# Patient Record
Sex: FEMALE | Race: BLACK OR AFRICAN AMERICAN | HISPANIC OR LATINO | Employment: UNEMPLOYED | URBAN - METROPOLITAN AREA
[De-identification: names, ages, dates, MRNs, and addresses within clinical notes are randomized per-mention and may not be internally consistent; named-entity substitution may affect disease eponyms.]

---

## 2017-06-16 ENCOUNTER — CONVERSION ENCOUNTER (OUTPATIENT)
Dept: MAMMOGRAPHY | Facility: CLINIC | Age: 62
End: 2017-06-16

## 2018-08-10 ENCOUNTER — TELEPHONE (OUTPATIENT)
Dept: FAMILY MEDICINE CLINIC | Facility: CLINIC | Age: 63
End: 2018-08-10

## 2018-08-10 NOTE — TELEPHONE ENCOUNTER
Pt states need blood pressure medication sent to pharmacy did not know which    Pt called back states med is losartan 25mg

## 2018-08-15 DIAGNOSIS — I10 BENIGN ESSENTIAL HTN: Primary | ICD-10-CM

## 2018-08-15 RX ORDER — ASPIRIN 81 MG/1
TABLET ORAL EVERY 24 HOURS
COMMUNITY
Start: 2018-06-01 | End: 2018-09-27 | Stop reason: SDUPTHER

## 2018-08-15 RX ORDER — LOSARTAN POTASSIUM AND HYDROCHLOROTHIAZIDE 25; 100 MG/1; MG/1
TABLET ORAL EVERY 24 HOURS
COMMUNITY
Start: 2018-06-01 | End: 2018-08-15 | Stop reason: SDUPTHER

## 2018-08-15 RX ORDER — LOSARTAN POTASSIUM AND HYDROCHLOROTHIAZIDE 25; 100 MG/1; MG/1
1 TABLET ORAL DAILY
Qty: 30 TABLET | Refills: 0 | Status: SHIPPED | OUTPATIENT
Start: 2018-08-15 | End: 2018-09-20 | Stop reason: SDUPTHER

## 2018-09-20 DIAGNOSIS — I10 BENIGN ESSENTIAL HTN: ICD-10-CM

## 2018-09-20 RX ORDER — LOSARTAN POTASSIUM AND HYDROCHLOROTHIAZIDE 25; 100 MG/1; MG/1
1 TABLET ORAL DAILY
Qty: 30 TABLET | Refills: 1 | Status: SHIPPED | OUTPATIENT
Start: 2018-09-20 | End: 2018-09-27 | Stop reason: SDUPTHER

## 2018-09-27 ENCOUNTER — OFFICE VISIT (OUTPATIENT)
Dept: FAMILY MEDICINE CLINIC | Facility: CLINIC | Age: 63
End: 2018-09-27
Payer: COMMERCIAL

## 2018-09-27 VITALS
RESPIRATION RATE: 15 BRPM | HEART RATE: 92 BPM | OXYGEN SATURATION: 99 % | TEMPERATURE: 98.2 F | WEIGHT: 166 LBS | SYSTOLIC BLOOD PRESSURE: 158 MMHG | DIASTOLIC BLOOD PRESSURE: 90 MMHG

## 2018-09-27 DIAGNOSIS — I10 ESSENTIAL HYPERTENSION: Primary | ICD-10-CM
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
## 2018-09-27 DIAGNOSIS — M54.41 RIGHT-SIDED LOW BACK PAIN WITH RIGHT-SIDED SCIATICA, UNSPECIFIED CHRONICITY: ICD-10-CM

## 2018-09-27 PROCEDURE — 99213 OFFICE O/P EST LOW 20 MIN: CPT | Performed by: INTERNAL MEDICINE

## 2018-09-27 RX ORDER — ASPIRIN 81 MG/1
81 TABLET ORAL EVERY 24 HOURS
Qty: 90 TABLET | Refills: 3 | Status: SHIPPED | OUTPATIENT
Start: 2018-09-27

## 2018-09-27 RX ORDER — LOSARTAN POTASSIUM AND HYDROCHLOROTHIAZIDE 25; 100 MG/1; MG/1
1 TABLET ORAL DAILY
Qty: 90 TABLET | Refills: 1 | Status: SHIPPED | OUTPATIENT
Start: 2018-09-27 | End: 2018-11-29 | Stop reason: SDUPTHER

## 2018-09-27 RX ORDER — AMLODIPINE BESYLATE 5 MG/1
5 TABLET ORAL DAILY
Qty: 30 TABLET | Refills: 1 | Status: SHIPPED | OUTPATIENT
Start: 2018-09-27 | End: 2018-11-29

## 2018-09-27 NOTE — ASSESSMENT & PLAN NOTE
Patient currently uncontrolled on current regimen  Counseled on diet and exercise, particularly consumption of low sodium and processed foods  Educated on home blood pressure logs  Continue current medication regimen with losartan/HCTZ  Started on amlodipine  BP check in 1 month

## 2018-09-27 NOTE — PATIENT INSTRUCTIONS
Ejercicios para la espalda baja   CUIDADO AMBULATORIO:   Los ejercicios para la espalda baja  ayudan a sanar y a fortalecer los músculos de high espalda para evitar otra lesión  Pregúntele a high médico si usted necesita acudir con un fisioterapeuta para que le indique ejercicios más avanzado  Busque atención médica de inmediato si:   · Usted tiene dolor severo que le impide moverse  Pregúntele a high Tamara Joshua vitaminas y minerales son adecuados para usted  · High dolor empeora  · Usted tiene un dolor nuevo  · Usted tiene preguntas o inquietudes acerca de high condición o cuidado  Realice los ejercicios para la espalda baja de manera childs:   · Mita kaylyn ejercicios sobre jeyson colchoneta o superficie firme  (no en la cama) para alejandrina soporte a la columna y evitar dolor en la parte baja de la espalda  · Muévase lenta y suavemente  Evite movimientos rápidos o bruscos  · Respire normalmente  No contenga la respiración  · Deténgase si siente dolor  Es normal que sienta cierta molestia al principio  Practicar los ejercicios con regularidad ayudará a disminuir high incomodidad con el paso del [place]  Ejercicios para la espalda baja: High médico podría recomendarle que realice ejercicios para la espalda de 10 a 30 minutos cada día  También podría recomendarle que mita ejercicios 1 a 3 veces cada día  Pregunte a high médico cuáles ejercicios son los mejores para usted y con qué frecuencia hacerlos  · Bombeo del tobillo:  Acuéstese boca arriba  Levante high pie (con kaylyn dedos apuntando hacia high camelia)  Luego, baje high pie (con los dedos apuntando lejos de usted)  Repita kalli ejercicio 10 veces en cada lado  · Deslizamiento de talón:  Acuéstese boca arriba  Muy despacio doble jeyson pierna y luego enderécela  Luego, doble la otra pierna y enderécela  Repita 10 veces en cada lado  · Inclinación pélvica:  Acuéstese boca arriba con kaylyn rodillas dobladas y kaylyn pies planos sobre el piso   Coloque kaylyn brazos en jeyson posición relajada junto a high cuerpo  Contraiga los músculos de high abdomen y aplane high espalda contra el piso  Sostenga está posición por 5 segundos  Repita 5 veces  · Estiramiento de la espalda:  Acuéstese boca arriba con kaylyn mercedes detrás de high camelia  Doble kaylyn rodillas y gire la mitad de high cuerpo hacia un lado  Mantenga esta posición por 10 segundos  Repita 3 veces en cada lado  · Levantamiento de la pierna estirada:  Acuéstese boca Ashanti Rudy con jeyson pierna estirada  Doble la otra rodilla  Contraiga high abdomen y luego levante lentamente la pierna estirada entre 6 a 12 pulgadas del piso  Mantenga esta posición por 1 a 5 segundos  Baje high pierna lentamente  Repita 10 veces en cada pierna  · Rodillas al pecho:  Acuéstese boca arriba con kaylyn rodillas dobladas y kaylyn pies planos sobre el piso  Drema Basket jeyson de las rodillas hacia high pecho y sosténgala por 5 segundos  Regrese high pierna a la posición inicial  Levante la otra rodilla hacia el pecho y sosténgala por 5 segundos  Pily esto 5 veces en cada lado  · Posición amanda camello:  Coloque kaylyn mercedes y [employer]  Arquee high espalda [place], hacia el techo y [place]  Arquee high tate dorsal lo más posible  Sostenga está posición por 5 segundos  Levante high camelia hacia arriba y baje high pecho hacia el piso  Sostenga está posición por 5 segundos  Pily 3 series o fredis se le indique  · Posición de cuclillas contra la pared:  Párese con high espalda contra la pared  Contraiga los músculos de high abdomen  Lentamente deslice high cuerpo hasta que kaylyn rodillas queden dobladas en un ángulo de 45 grados  Mantenga esta posición por 5 segundos  Deslice lentamente high espalda hacia arriba hasta quedar de pie  Repita 10 veces  · Posición de acurrucarse:  Acuéstese boca arriba con kaylyn rodillas dobladas y kaylyn pies planos sobre el piso   Coloque kaylyn mercedes con las nella hacia abajo debajo de la curva de la parte baja de swift espalda  Después, con kaylyn codos [employer], levante kaylyn hombros y [place] 2 a 3 pulgadas  Mantenga swift camelia a la misma altura de kaylyn hombros  Mantenga esta posición por 5 segundos  Cuando usted pueda hacer kalli ejercicio sin sentir dolor por 10 a 15 segundos, puede entonces añadir jeyson rotación  Mientras kaylyn hombros y swift pecho estén levantados del piso, voltee levemente hacia la izquierda y [place]  Repita en el otro lado  · Ejercicio pájaro randy:  Coloque kaylyn mercedes y rodillas sobre el piso  Mantenga kaylyn muñecas directamente debajo de kaylyn hombros y kaylyn rodillas directamente debajo de kaylyn caderas  Contraiga swift ombligo hacia adentro en dirección a swift columna  No estire ni arquee swift espalda  Ponga tensos kaylyn músculos abdominales  Levante un brazo extendido para que se alinee con swift camelia  Luego, levante la pierna opuesta a swift brazo  Mantenga esta posición por 15 segundos  Baje swift Klaudia Joshua y pierna lentamente y Kika de lado  Pily 5 series  © 2017 [address] [facility] Information is for End User's use only and may not be sold, redistributed or otherwise used for commercial purposes  All illustrations and images included in CareNotes® are the copyrighted property of A D A M , Inc  or Zachary Teresa  Esta información es sólo para uso en educación  Swift intención no es darle un consejo médico sobre enfermedades o tratamientos  Colsulte con swift Stormy Binder farmacéutico antes de seguir cualquier régimen médico para saber si es seguro y efectivo para usted

## 2018-10-01 NOTE — PROGRESS NOTES
Assessment/Plan:    Essential hypertension  Patient currently uncontrolled on current regimen  Counseled on diet and exercise, particularly consumption of low sodium and processed foods  Educated on home blood pressure logs  Continue current medication regimen with losartan/HCTZ  Started on amlodipine  BP check in 1 month         Diagnoses and all orders for this visit:    Essential hypertension  -     amLODIPine (NORVASC) 5 mg tablet; Take 1 tablet (5 mg total) by mouth daily  -     Basic metabolic panel; Future  -     losartan-hydrochlorothiazide (HYZAAR) 100-25 MG per tablet; Take 1 tablet by mouth daily  -     aspirin (ECOTRIN LOW STRENGTH) 81 mg EC tablet; Take 1 tablet (81 mg total) by mouth every 24 hours    Right-sided low back pain with right-sided sciatica, unspecified chronicity  -     Ambulatory referral to Physical Therapy; Future  -     XR spine lumbar 2 or 3 views injury; Future    Fall down stairs, initial encounter  -     Ambulatory referral to Physical Therapy; Future  -     XR spine lumbar 2 or 3 views injury; Future        Subjective:      Patient ID: Ken Phelan is a 61 y o  female  Patient is here for follow up of her HTN  She states that she fell down stairs several days ago and fell directly on her butt  She has pain in her L lower back that has improved since the initial incident  She reports that she does have a history of chronic lower back pain  The following portions of the patient's history were reviewed and updated as appropriate: allergies, current medications, past family history, past medical history, past social history, past surgical history and problem list     Review of Systems   Constitutional: Negative for appetite change, chills, fatigue, fever and unexpected weight change  HENT: Negative for congestion, ear pain, rhinorrhea, sinus pain, sinus pressure and sore throat  Eyes: Negative for visual disturbance     Respiratory: Negative for cough, chest tightness, shortness of breath and wheezing  Cardiovascular: Negative for chest pain, palpitations and leg swelling  Gastrointestinal: Negative for abdominal pain, constipation, diarrhea, nausea and vomiting  Genitourinary: Negative for dysuria and flank pain  Musculoskeletal: Positive for back pain  Negative for arthralgias, myalgias and neck pain  Skin: Negative for rash  Allergic/Immunologic: Positive for environmental allergies  Neurological: Negative for dizziness, light-headedness and headaches  Psychiatric/Behavioral: Negative for suicidal ideas  Objective:      /90 (BP Location: Left arm, Patient Position: Sitting, Cuff Size: Standard)   Pulse 92   Temp 98 2 °F (36 8 °C) (Temporal)   Resp 15   Wt 75 3 kg (166 lb)   SpO2 99%          Physical Exam   Constitutional: She is oriented to person, place, and time  She appears well-developed and well-nourished  No distress  HENT:   Head: Normocephalic and atraumatic  Eyes: Pupils are equal, round, and reactive to light  Neck: Normal range of motion  Cardiovascular: Normal rate and normal heart sounds  No murmur heard  Pulmonary/Chest: Effort normal  She has no wheezes  Abdominal: Soft  There is no tenderness  Musculoskeletal: Normal range of motion  She exhibits no edema or tenderness  Lumbar back: She exhibits pain  Neurological: She is alert and oriented to person, place, and time  Skin: Skin is warm and dry  Bruising noted  No rash noted  Psychiatric: She has a normal mood and affect   Her behavior is normal

## 2018-11-29 ENCOUNTER — OFFICE VISIT (OUTPATIENT)
Dept: FAMILY MEDICINE CLINIC | Facility: CLINIC | Age: 63
End: 2018-11-29
Payer: COMMERCIAL

## 2018-11-29 ENCOUNTER — APPOINTMENT (OUTPATIENT)
Dept: LAB | Facility: HOSPITAL | Age: 63
End: 2018-11-29
Payer: COMMERCIAL

## 2018-11-29 VITALS
SYSTOLIC BLOOD PRESSURE: 130 MMHG | OXYGEN SATURATION: 97 % | WEIGHT: 167 LBS | DIASTOLIC BLOOD PRESSURE: 86 MMHG | TEMPERATURE: 97.9 F | RESPIRATION RATE: 16 BRPM | HEART RATE: 93 BPM

## 2018-11-29 DIAGNOSIS — I10 ESSENTIAL HYPERTENSION: ICD-10-CM

## 2018-11-29 DIAGNOSIS — I10 ESSENTIAL HYPERTENSION: Primary | ICD-10-CM

## 2018-11-29 DIAGNOSIS — Z11.59 NEED FOR HEPATITIS C SCREENING TEST: ICD-10-CM

## 2018-11-29 DIAGNOSIS — Z23 NEED FOR VACCINATION: ICD-10-CM

## 2018-11-29 LAB
ALBUMIN SERPL BCP-MCNC: 4.6 G/DL (ref 3–5.2)
ALP SERPL-CCNC: 67 U/L (ref 43–122)
ALT SERPL W P-5'-P-CCNC: 28 U/L (ref 9–52)
ANION GAP SERPL CALCULATED.3IONS-SCNC: 7 MMOL/L (ref 5–14)
AST SERPL W P-5'-P-CCNC: 24 U/L (ref 14–36)
BILIRUB SERPL-MCNC: 0.5 MG/DL
BUN SERPL-MCNC: 16 MG/DL (ref 5–25)
CALCIUM SERPL-MCNC: 9.6 MG/DL (ref 8.4–10.2)
CHLORIDE SERPL-SCNC: 98 MMOL/L (ref 97–108)
CHOLEST SERPL-MCNC: 245 MG/DL
CO2 SERPL-SCNC: 32 MMOL/L (ref 22–30)
CREAT SERPL-MCNC: 0.67 MG/DL (ref 0.6–1.2)
ERYTHROCYTE [DISTWIDTH] IN BLOOD BY AUTOMATED COUNT: 13.5 %
GFR SERPL CREATININE-BSD FRML MDRD: 94 ML/MIN/1.73SQ M
GLUCOSE SERPL-MCNC: 112 MG/DL (ref 70–99)
HCT VFR BLD AUTO: 39.7 % (ref 36–46)
HDLC SERPL-MCNC: 64 MG/DL (ref 40–59)
HGB BLD-MCNC: 13.1 G/DL (ref 12–16)
LDLC SERPL CALC-MCNC: 134 MG/DL
MCH RBC QN AUTO: 28.4 PG (ref 26–34)
MCHC RBC AUTO-ENTMCNC: 33.1 G/DL (ref 31–36)
MCV RBC AUTO: 86 FL (ref 80–100)
NONHDLC SERPL-MCNC: 181 MG/DL
PLATELET # BLD AUTO: 298 THOUSANDS/UL (ref 150–450)
PMV BLD AUTO: 8.7 FL (ref 8.9–12.7)
POTASSIUM SERPL-SCNC: 3.7 MMOL/L (ref 3.6–5)
PROT SERPL-MCNC: 8.1 G/DL (ref 5.9–8.4)
RBC # BLD AUTO: 4.63 MILLION/UL (ref 4–5.2)
SODIUM SERPL-SCNC: 137 MMOL/L (ref 137–147)
TRIGL SERPL-MCNC: 235 MG/DL
WBC # BLD AUTO: 3.5 THOUSAND/UL (ref 4.5–11)

## 2018-11-29 PROCEDURE — 90682 RIV4 VACC RECOMBINANT DNA IM: CPT | Performed by: FAMILY MEDICINE

## 2018-11-29 PROCEDURE — 86803 HEPATITIS C AB TEST: CPT

## 2018-11-29 PROCEDURE — 36415 COLL VENOUS BLD VENIPUNCTURE: CPT

## 2018-11-29 PROCEDURE — 3725F SCREEN DEPRESSION PERFORMED: CPT | Performed by: FAMILY MEDICINE

## 2018-11-29 PROCEDURE — 99213 OFFICE O/P EST LOW 20 MIN: CPT | Performed by: FAMILY MEDICINE

## 2018-11-29 PROCEDURE — 90471 IMMUNIZATION ADMIN: CPT | Performed by: FAMILY MEDICINE

## 2018-11-29 PROCEDURE — 80053 COMPREHEN METABOLIC PANEL: CPT

## 2018-11-29 PROCEDURE — 80061 LIPID PANEL: CPT

## 2018-11-29 PROCEDURE — 85027 COMPLETE CBC AUTOMATED: CPT

## 2018-11-29 RX ORDER — LOSARTAN POTASSIUM AND HYDROCHLOROTHIAZIDE 25; 100 MG/1; MG/1
1 TABLET ORAL DAILY
Qty: 90 TABLET | Refills: 1 | Status: SHIPPED | OUTPATIENT
Start: 2018-11-29 | End: 2019-05-23 | Stop reason: SDUPTHER

## 2018-11-30 LAB — HCV AB SER QL: NORMAL

## 2018-12-03 NOTE — ASSESSMENT & PLAN NOTE
Patient controlled on current regimen  Counseled on diet and exercise, particularly consumption of low sodium and processed foods  Educated on home blood pressure logs  Advised smoking cessation  Continue current medication regimen with hyzaar  CBC, CMP ordered

## 2018-12-03 NOTE — PROGRESS NOTES
Assessment/Plan:    Essential hypertension  Patient controlled on current regimen  Counseled on diet and exercise, particularly consumption of low sodium and processed foods  Educated on home blood pressure logs  Advised smoking cessation  Continue current medication regimen with hyzaar  CBC, CMP ordered         Diagnoses and all orders for this visit:    Essential hypertension  -     losartan-hydrochlorothiazide (HYZAAR) 100-25 MG per tablet; Take 1 tablet by mouth daily  -     Comprehensive metabolic panel; Future  -     CBC and Platelet; Future  -     Lipid panel; Future    Need for vaccination  -     influenza vaccine, [id number], quadrivalent, recombinant, PF, 0 5 mL, for patients 18 yr+ (FLUBLOK)    Need for hepatitis C screening test  -     Hepatitis C antibody; Future    Other orders  -     Cancel: TDAP VACCINE GREATER THAN OR EQUAL TO 6YO IM          Subjective:      Patient ID: Wandy Almodovar is a 61 y o  female  Patient comes in with her son in law for routine follow up  She has no complaints today  As for her injuries in her previous visit from a fall, she has no additional complaints today regarding leg or back pain and never got the imaging she was told to get at her previous visit  She denies chest pain, shortness of breath, nausea, vomiting, diarrhea or constipation  The following portions of the patient's history were reviewed and updated as appropriate: allergies, current medications, past family history, past medical history, past social history, past surgical history and problem list     Review of Systems   Constitutional: Negative for appetite change, chills, fatigue, fever and unexpected weight change  HENT: Negative for congestion, ear pain, rhinorrhea, sinus pain, sinus pressure and sore throat  Eyes: Negative for visual disturbance  Respiratory: Negative for cough, chest tightness, shortness of breath and wheezing      Cardiovascular: Negative for chest pain, palpitations and leg swelling  Gastrointestinal: Negative for abdominal pain, constipation, diarrhea, nausea and vomiting  Genitourinary: Negative for dysuria and flank pain  Musculoskeletal: Negative for arthralgias, back pain, myalgias and neck pain  Skin: Negative for rash  Allergic/Immunologic: Negative for environmental allergies  Neurological: Negative for dizziness, light-headedness and headaches  Psychiatric/Behavioral: Negative for suicidal ideas  Objective:      /86 (BP Location: Left arm, Patient Position: Sitting, Cuff Size: Large)   Pulse 93   Temp 97 9 °F (36 6 °C)   Resp 16   Wt 75 8 kg (167 lb)   SpO2 97%          Physical Exam   Constitutional: She is oriented to person, place, and time  She appears well-developed and well-nourished  No distress  HENT:   Head: Normocephalic and atraumatic  Eyes: Pupils are equal, round, and reactive to light  Neck: Normal range of motion  Cardiovascular: Normal rate and normal heart sounds  No murmur heard  Pulmonary/Chest: Effort normal  She has no wheezes  Abdominal: Soft  There is no tenderness  Musculoskeletal: Normal range of motion  She exhibits no edema or tenderness  Neurological: She is alert and oriented to person, place, and time  Skin: Skin is warm and dry  No rash noted  Psychiatric: She has a normal mood and affect   Her behavior is normal

## 2019-05-23 ENCOUNTER — HOSPITAL ENCOUNTER (OUTPATIENT)
Dept: RADIOLOGY | Facility: HOSPITAL | Age: 64
Discharge: HOME | End: 2019-05-23
Payer: COMMERCIAL

## 2019-05-23 ENCOUNTER — OFFICE VISIT (OUTPATIENT)
Dept: FAMILY MEDICINE CLINIC | Facility: CLINIC | Age: 64
End: 2019-05-23

## 2019-05-23 VITALS
WEIGHT: 162 LBS | TEMPERATURE: 97.8 F | HEART RATE: 76 BPM | DIASTOLIC BLOOD PRESSURE: 82 MMHG | OXYGEN SATURATION: 100 % | SYSTOLIC BLOOD PRESSURE: 138 MMHG | RESPIRATION RATE: 16 BRPM

## 2019-05-23 DIAGNOSIS — I10 ESSENTIAL HYPERTENSION: ICD-10-CM

## 2019-05-23 DIAGNOSIS — M67.912 TENDINOPATHY OF ROTATOR CUFF, LEFT: Primary | ICD-10-CM

## 2019-05-23 PROCEDURE — 73030 X-RAY EXAM OF SHOULDER: CPT

## 2019-05-23 PROCEDURE — 99213 OFFICE O/P EST LOW 20 MIN: CPT | Performed by: INTERNAL MEDICINE

## 2019-05-23 RX ORDER — NAPROXEN 500 MG/1
500 TABLET ORAL 2 TIMES DAILY PRN
Qty: 20 TABLET | Refills: 0 | Status: SHIPPED | OUTPATIENT
Start: 2019-05-23 | End: 2021-01-29 | Stop reason: ALTCHOICE

## 2019-05-23 RX ORDER — LOSARTAN POTASSIUM AND HYDROCHLOROTHIAZIDE 25; 100 MG/1; MG/1
1 TABLET ORAL DAILY
Qty: 180 TABLET | Refills: 0 | Status: SHIPPED | OUTPATIENT
Start: 2019-05-23 | End: 2019-09-10 | Stop reason: SDUPTHER

## 2019-06-13 ENCOUNTER — OFFICE VISIT (OUTPATIENT)
Dept: FAMILY MEDICINE CLINIC | Facility: CLINIC | Age: 64
End: 2019-06-13

## 2019-06-13 VITALS
WEIGHT: 162 LBS | OXYGEN SATURATION: 98 % | RESPIRATION RATE: 16 BRPM | SYSTOLIC BLOOD PRESSURE: 132 MMHG | TEMPERATURE: 97.6 F | DIASTOLIC BLOOD PRESSURE: 72 MMHG | HEART RATE: 91 BPM

## 2019-06-13 DIAGNOSIS — S86.812A STRAIN OF CALF MUSCLE, LEFT, INITIAL ENCOUNTER: ICD-10-CM

## 2019-06-13 DIAGNOSIS — Z12.11 SCREENING FOR COLON CANCER: ICD-10-CM

## 2019-06-13 DIAGNOSIS — I10 ESSENTIAL HYPERTENSION: Primary | ICD-10-CM

## 2019-06-13 DIAGNOSIS — M19.012 ARTHRITIS OF LEFT ACROMIOCLAVICULAR JOINT: ICD-10-CM

## 2019-06-13 PROBLEM — M67.912 TENDINOPATHY OF ROTATOR CUFF, LEFT: Status: RESOLVED | Noted: 2019-05-23 | Resolved: 2019-06-13

## 2019-06-13 PROCEDURE — 99213 OFFICE O/P EST LOW 20 MIN: CPT | Performed by: FAMILY MEDICINE

## 2019-08-14 ENCOUNTER — OFFICE VISIT (OUTPATIENT)
Dept: FAMILY MEDICINE CLINIC | Facility: CLINIC | Age: 64
End: 2019-08-14

## 2019-08-14 ENCOUNTER — TRANSCRIBE ORDERS (OUTPATIENT)
Dept: LAB | Facility: CLINIC | Age: 64
End: 2019-08-14

## 2019-08-14 ENCOUNTER — APPOINTMENT (OUTPATIENT)
Dept: LAB | Facility: CLINIC | Age: 64
End: 2019-08-14
Payer: COMMERCIAL

## 2019-08-14 VITALS
SYSTOLIC BLOOD PRESSURE: 126 MMHG | RESPIRATION RATE: 18 BRPM | TEMPERATURE: 98 F | WEIGHT: 156 LBS | DIASTOLIC BLOOD PRESSURE: 72 MMHG | HEART RATE: 77 BPM | OXYGEN SATURATION: 98 %

## 2019-08-14 DIAGNOSIS — Z00.00 HEALTHCARE MAINTENANCE: ICD-10-CM

## 2019-08-14 DIAGNOSIS — I10 ESSENTIAL HYPERTENSION: ICD-10-CM

## 2019-08-14 DIAGNOSIS — M19.012 ARTHRITIS OF LEFT ACROMIOCLAVICULAR JOINT: Primary | ICD-10-CM

## 2019-08-14 DIAGNOSIS — R25.2 BILATERAL LEG CRAMPS: ICD-10-CM

## 2019-08-14 LAB
25(OH)D3 SERPL-MCNC: 20.4 NG/ML (ref 30–100)
ALBUMIN SERPL BCP-MCNC: 4 G/DL (ref 3.5–5)
ALP SERPL-CCNC: 67 U/L (ref 46–116)
ALT SERPL W P-5'-P-CCNC: 21 U/L (ref 12–78)
ANION GAP SERPL CALCULATED.3IONS-SCNC: 5 MMOL/L (ref 4–13)
AST SERPL W P-5'-P-CCNC: 14 U/L (ref 5–45)
BASOPHILS # BLD AUTO: 0.03 THOUSANDS/ΜL (ref 0–0.1)
BASOPHILS NFR BLD AUTO: 1 % (ref 0–1)
BILIRUB SERPL-MCNC: 0.58 MG/DL (ref 0.2–1)
BUN SERPL-MCNC: 14 MG/DL (ref 5–25)
CALCIUM SERPL-MCNC: 9.4 MG/DL (ref 8.3–10.1)
CHLORIDE SERPL-SCNC: 102 MMOL/L (ref 100–108)
CHOLEST SERPL-MCNC: 229 MG/DL (ref 50–200)
CO2 SERPL-SCNC: 30 MMOL/L (ref 21–32)
CREAT SERPL-MCNC: 0.74 MG/DL (ref 0.6–1.3)
EOSINOPHIL # BLD AUTO: 0.15 THOUSAND/ΜL (ref 0–0.61)
EOSINOPHIL NFR BLD AUTO: 5 % (ref 0–6)
ERYTHROCYTE [DISTWIDTH] IN BLOOD BY AUTOMATED COUNT: 13.3 % (ref 11.6–15.1)
EST. AVERAGE GLUCOSE BLD GHB EST-MCNC: 123 MG/DL
GFR SERPL CREATININE-BSD FRML MDRD: 86 ML/MIN/1.73SQ M
GLUCOSE P FAST SERPL-MCNC: 96 MG/DL (ref 65–99)
HBA1C MFR BLD: 5.9 % (ref 4.2–6.3)
HCT VFR BLD AUTO: 41.5 % (ref 34.8–46.1)
HDLC SERPL-MCNC: 78 MG/DL (ref 40–60)
HGB BLD-MCNC: 13.5 G/DL (ref 11.5–15.4)
IMM GRANULOCYTES # BLD AUTO: 0 THOUSAND/UL (ref 0–0.2)
IMM GRANULOCYTES NFR BLD AUTO: 0 % (ref 0–2)
LDLC SERPL CALC-MCNC: 127 MG/DL (ref 0–100)
LYMPHOCYTES # BLD AUTO: 1.64 THOUSANDS/ΜL (ref 0.6–4.47)
LYMPHOCYTES NFR BLD AUTO: 49 % (ref 14–44)
MCH RBC QN AUTO: 28.5 PG (ref 26.8–34.3)
MCHC RBC AUTO-ENTMCNC: 32.5 G/DL (ref 31.4–37.4)
MCV RBC AUTO: 88 FL (ref 82–98)
MONOCYTES # BLD AUTO: 0.4 THOUSAND/ΜL (ref 0.17–1.22)
MONOCYTES NFR BLD AUTO: 12 % (ref 4–12)
NEUTROPHILS # BLD AUTO: 1.1 THOUSANDS/ΜL (ref 1.85–7.62)
NEUTS SEG NFR BLD AUTO: 33 % (ref 43–75)
NONHDLC SERPL-MCNC: 151 MG/DL
NRBC BLD AUTO-RTO: 0 /100 WBCS
PLATELET # BLD AUTO: 308 THOUSANDS/UL (ref 149–390)
PMV BLD AUTO: 10.9 FL (ref 8.9–12.7)
POTASSIUM SERPL-SCNC: 3.7 MMOL/L (ref 3.5–5.3)
PROT SERPL-MCNC: 8.1 G/DL (ref 6.4–8.2)
RBC # BLD AUTO: 4.74 MILLION/UL (ref 3.81–5.12)
SODIUM SERPL-SCNC: 137 MMOL/L (ref 136–145)
TRIGL SERPL-MCNC: 122 MG/DL
TSH SERPL DL<=0.05 MIU/L-ACNC: 1.31 UIU/ML (ref 0.36–3.74)
WBC # BLD AUTO: 3.32 THOUSAND/UL (ref 4.31–10.16)

## 2019-08-14 PROCEDURE — 85025 COMPLETE CBC W/AUTO DIFF WBC: CPT

## 2019-08-14 PROCEDURE — 99213 OFFICE O/P EST LOW 20 MIN: CPT | Performed by: PHYSICIAN ASSISTANT

## 2019-08-14 PROCEDURE — 82306 VITAMIN D 25 HYDROXY: CPT

## 2019-08-14 PROCEDURE — 80053 COMPREHEN METABOLIC PANEL: CPT

## 2019-08-14 PROCEDURE — 36415 COLL VENOUS BLD VENIPUNCTURE: CPT

## 2019-08-14 PROCEDURE — 83036 HEMOGLOBIN GLYCOSYLATED A1C: CPT

## 2019-08-14 PROCEDURE — 84443 ASSAY THYROID STIM HORMONE: CPT

## 2019-08-14 PROCEDURE — 80061 LIPID PANEL: CPT

## 2019-08-14 NOTE — PROGRESS NOTES
Assessment/Plan:  - Will order blood work - CBC, CMP, HgbA1c, lipid panel, TSH, vitamin D  Hypertension  - Continue losartan-HCTZ 100-25 mg once daily  - Currently blood pressure is controlled at this time  Reviewed BP target goal with patient  Continue to maintain healthy balanced diet with focus on low salt intake  Limit alcohol intake  - Advised to exercise at least 30 minutes a day for at least 5 days out of the week  Arthritis of left acromioclavicular joint  - Stable  Continue taking naproxen 500 mg as needed for pain  Continue daily stretches and exercises  Continue applying heating pad/ ice as needed to the affected area  Discussed with patient option of going to physical therapy and/or having a steroid injection of her shoulder  Patient declines both at this time  Will continue to monitor  Bilateral leg cramps  - Advised to continue applying heating pads as needed to the affected areas  Advised to massage the area daily  Will order CMP to assess for electrolyte abnormalities  Advised to stay well hydrated by drinking water throughout the day  Will continue to monitor  Diagnoses and all orders for this visit:    Arthritis of left acromioclavicular joint    Healthcare maintenance  -     CBC and differential; Future  -     Comprehensive metabolic panel; Future  -     Lipid panel; Future  -     TSH, 3rd generation with Free T4 reflex; Future  -     Hemoglobin A1C; Future  -     Vitamin D 25 hydroxy; Future    Essential hypertension    Bilateral leg cramps        All of patients questions were answered  Patient understands and agrees with the above plan  Return in about 6 months (around 2/14/2020), or if symptoms worsen or fail to improve, for Next scheduled follow up      Cielo Stokes PA-C  08/14/19  [facility]           Subjective:     Patient ID: Jocy Martinez  is a 61 y o  female with known PMH of   Hypertension, arthritis of left acromioclavicular joint who presents today in office for left shoulder pain follow-up  - Patient is a 61 y o  female who presents today for left shoulder pain follow-up  Patient notes her left shoulder does not her all the time  She notes it mostly hurts when she bends forward  She notes she takes a naproxen as needed -she notes she maybe takes it once a week if that  Patient notes she was given a list of exercises to perform at home a last office visit and she notes she has been performing them daily  Patient notes she has also been applying heating pad as needed to the affected area which has been helpful  - Patient notes for the past couple weeks she has been experiencing cramping and both lower legs  She notes it is pretty constant, but is worse often at night  And after walking on her feet for a long period  Patient notes at work she does walk around on her feet for 7-8 hours at a time  Patient notes when she bends her knees, it causes more pain in her calves  Patient notes the pain is worse when she stands up after sitting for a while  Patient denies any trauma or injury to the area  Patient denies any redness or bruising of the area  Patient has been applying heating pads as needed to the affected areas which has been helpful  - Patient is currently taking losartan-HCTZ 100-25 mg once daily for her high blood pressure  Patient denies any chest pain, dizziness, visual changes, or lower leg swelling  The following portions of the patient's history were reviewed and updated as appropriate: allergies, current medications, past family history, past medical history, past social history, past surgical history and problem list         Review of Systems   Constitutional: Negative for appetite change, fatigue and fever  HENT: Negative for congestion, ear pain and sore throat  Eyes: Negative for pain and visual disturbance  Respiratory: Negative for cough, chest tightness and shortness of breath      Cardiovascular: Negative for chest pain, palpitations and leg swelling  Gastrointestinal: Negative for abdominal pain, constipation, diarrhea, nausea and vomiting  Genitourinary: Negative for difficulty urinating and dysuria  Musculoskeletal: Positive for arthralgias (left shoulder pain) and myalgias (bilateral lower leg cramping)  Negative for back pain  Skin: Negative for rash  Neurological: Negative for dizziness and headaches  Psychiatric/Behavioral: Negative for behavioral problems  The patient is not nervous/anxious  Objective:   Vitals:    08/14/19 0850   BP: 126/72   BP Location: Right arm   Patient Position: Sitting   Cuff Size: Standard   Pulse: 77   Resp: 18   Temp: 98 °F (36 7 °C)   TempSrc: Temporal   SpO2: 98%   Weight: 70 8 kg (156 lb)         Physical Exam   Constitutional: She is oriented to person, place, and time  She appears well-developed and well-nourished  HENT:   Head: Normocephalic and atraumatic  Right Ear: External ear normal    Left Ear: External ear normal    Nose: Nose normal    Mouth/Throat: Uvula is midline, oropharynx is clear and moist and mucous membranes are normal    Eyes: Pupils are equal, round, and reactive to light  Conjunctivae and EOM are normal    Neck: Normal range of motion  Neck supple  Cardiovascular: Normal rate, regular rhythm, normal heart sounds and intact distal pulses  No murmur heard  Pulmonary/Chest: Effort normal and breath sounds normal  She has no wheezes  Abdominal: Soft  Bowel sounds are normal  There is no tenderness  Musculoskeletal: She exhibits no edema  Left shoulder: She exhibits decreased range of motion and tenderness  She exhibits no bony tenderness, no swelling and no effusion  Slight tenderness noted upon palpation of bilateral calves  No erythema, edema, or ecchymosis noted  Strength 5/5 bilateral LE  Neurological: She is alert and oriented to person, place, and time  Skin: Skin is warm and dry     Psychiatric: She has a normal mood and affect  Her speech is normal and behavior is normal    Nursing note and vitals reviewed

## 2019-09-10 DIAGNOSIS — I10 ESSENTIAL HYPERTENSION: ICD-10-CM

## 2019-09-11 RX ORDER — LOSARTAN POTASSIUM AND HYDROCHLOROTHIAZIDE 25; 100 MG/1; MG/1
1 TABLET ORAL DAILY
Qty: 180 TABLET | Refills: 0 | Status: SHIPPED | OUTPATIENT
Start: 2019-09-11 | End: 2020-07-08 | Stop reason: SDUPTHER

## 2019-11-18 DIAGNOSIS — E55.9 VITAMIN D DEFICIENCY: Primary | ICD-10-CM

## 2019-11-18 RX ORDER — CHOLECALCIFEROL (VITAMIN D3) 50 MCG
2000 TABLET ORAL DAILY
Qty: 90 TABLET | Refills: 1 | Status: SHIPPED | OUTPATIENT
Start: 2019-11-18

## 2020-07-08 DIAGNOSIS — I10 ESSENTIAL HYPERTENSION: ICD-10-CM

## 2020-07-09 RX ORDER — LOSARTAN POTASSIUM AND HYDROCHLOROTHIAZIDE 25; 100 MG/1; MG/1
1 TABLET ORAL DAILY
Qty: 180 TABLET | Refills: 0 | Status: SHIPPED | OUTPATIENT
Start: 2020-07-09 | End: 2021-01-27

## 2021-01-20 ENCOUNTER — TELEPHONE (OUTPATIENT)
Dept: FAMILY MEDICINE CLINIC | Facility: CLINIC | Age: 66
End: 2021-01-20

## 2021-01-20 DIAGNOSIS — I10 ESSENTIAL HYPERTENSION: ICD-10-CM

## 2021-01-27 RX ORDER — LOSARTAN POTASSIUM AND HYDROCHLOROTHIAZIDE 25; 100 MG/1; MG/1
TABLET ORAL
Qty: 180 TABLET | Refills: 0 | Status: SHIPPED | OUTPATIENT
Start: 2021-01-27 | End: 2021-01-29 | Stop reason: SDUPTHER

## 2021-01-29 ENCOUNTER — OFFICE VISIT (OUTPATIENT)
Dept: FAMILY MEDICINE CLINIC | Facility: CLINIC | Age: 66
End: 2021-01-29

## 2021-01-29 VITALS
OXYGEN SATURATION: 99 % | HEART RATE: 98 BPM | SYSTOLIC BLOOD PRESSURE: 134 MMHG | WEIGHT: 173.1 LBS | BODY MASS INDEX: 34.9 KG/M2 | HEIGHT: 59 IN | DIASTOLIC BLOOD PRESSURE: 80 MMHG | RESPIRATION RATE: 18 BRPM | TEMPERATURE: 97.5 F

## 2021-01-29 DIAGNOSIS — E66.09 CLASS 2 OBESITY DUE TO EXCESS CALORIES WITHOUT SERIOUS COMORBIDITY WITH BODY MASS INDEX (BMI) OF 35.0 TO 35.9 IN ADULT: ICD-10-CM

## 2021-01-29 DIAGNOSIS — M22.2X1 PATELLOFEMORAL PAIN SYNDROME OF RIGHT KNEE: ICD-10-CM

## 2021-01-29 DIAGNOSIS — E55.9 VITAMIN D DEFICIENCY: ICD-10-CM

## 2021-01-29 DIAGNOSIS — Z00.00 ANNUAL PHYSICAL EXAM: Primary | ICD-10-CM

## 2021-01-29 DIAGNOSIS — I10 ESSENTIAL HYPERTENSION: ICD-10-CM

## 2021-01-29 DIAGNOSIS — Z13.31 DEPRESSION SCREEN: ICD-10-CM

## 2021-01-29 PROCEDURE — 99397 PER PM REEVAL EST PAT 65+ YR: CPT | Performed by: PHYSICIAN ASSISTANT

## 2021-01-29 RX ORDER — LOSARTAN POTASSIUM AND HYDROCHLOROTHIAZIDE 25; 100 MG/1; MG/1
1 TABLET ORAL DAILY
Qty: 180 TABLET | Refills: 1 | Status: SHIPPED | OUTPATIENT
Start: 2021-01-29 | End: 2022-02-13

## 2021-01-29 NOTE — ASSESSMENT & PLAN NOTE
- Continue losartan-HCTZ 100-25 mg once daily  - Currently blood pressure is controlled at this time  Reviewed BP target goal with patient  - Continue to maintain healthy balanced diet with focus on low salt intake  Limit alcohol intake  - Advised to exercise at least 30 minutes a day for at least 5 days out of the week

## 2021-01-29 NOTE — PROGRESS NOTES
[facility] PRACTICE ZARA    NAME: Iron Corey  AGE: 72 y o  SEX: female  : 1955     DATE: 2021     Assessment and Plan:     Problem List Items Addressed This Visit        Cardiovascular and Mediastinum    Essential hypertension     - Continue losartan-HCTZ 100-25 mg once daily  - Currently blood pressure is controlled at this time  Reviewed BP target goal with patient  - Continue to maintain healthy balanced diet with focus on low salt intake  Limit alcohol intake  - Advised to exercise at least 30 minutes a day for at least 5 days out of the week  Relevant Medications    losartan-hydrochlorothiazide (HYZAAR) 100-25 MG per tablet    Other Relevant Orders    CBC and differential    Comprehensive metabolic panel    Lipid panel       Musculoskeletal and Integument    Patellofemoral pain syndrome of right knee     - Stable  Continue OTD diclofenac as needed  Other Visit Diagnoses     Annual physical exam    -  Primary    Depression screen        Vitamin D deficiency        Relevant Orders    Vitamin D 25 hydroxy    Class 2 obesity due to excess calories without serious comorbidity with body mass index (BMI) of 35 0 to 35 9 in adult            Depression Screening Follow-up Plan: Patient's depression screening was positive with a PHQ-2 score of 0  Their PHQ-9 score was not indicated  Clinically patient does not have depression  No treatment is required  Immunizations and preventive care screenings were discussed with patient today  Appropriate education was printed on patient's after visit summary  Counseling:  Alcohol/drug use: discussed moderation in alcohol intake, the recommendations for healthy alcohol use, and avoidance of illicit drug use  Dental Health: discussed importance of regular tooth brushing, flossing, and dental visits    Injury prevention: discussed safety/seat belts, safety helmets, smoke detectors, carbon dioxide detectors, and smoking near bedding or upholstery  Sexual health: discussed sexually transmitted diseases, partner selection, use of condoms, avoidance of unintended pregnancy, and contraceptive alternatives  · Exercise: the importance of regular exercise/physical activity was discussed  Recommend exercise 3-5 times per week for at least 30 minutes  BMI Counseling: Body mass index is 35 56 kg/m²  The BMI is above normal  Nutrition recommendations include decreasing portion sizes, encouraging healthy choices of fruits and vegetables, consuming healthier snacks, moderation in carbohydrate intake and reducing intake of cholesterol  Exercise recommendations include moderate physical activity 150 minutes/week and strength training exercises  No pharmacotherapy was ordered  Return in about 6 months (around 7/29/2021) for Next scheduled follow up HTN  Chief Complaint:     Chief Complaint   Patient presents with    Hypertension     f/u     Medication Refill      History of Present Illness:     Adult Annual Physical   Patient here for a comprehensive physical exam      Hypertension: Currently taking losartan -hydrochlorothiazide 100-25 mg once daily  Patient notes she did not have her medication for a few days and during that time, she did experience some headaches  Patient notes once she started taking the medication again, the headaches resolved  Patient denies any dizziness, blurry vision, chest pain, or lower leg swelling     - Patient reported to [facility] ED on 08/20/2020 due to right knee pain  Right knee x-ray revealed no acute fracture  Patient followed up with Orthopedics and received cortisone injection of her right knee  Patient notes the shot did help her a lot  Today, patient notes she does continue to have pain often on  Patient notes she will take diclofenac as needed which does help        Diet and Physical Activity  · Diet/Nutrition: well balanced diet  · Exercise: no formal exercise and patient notes she is active around the house with ADLs         Depression Screening  PHQ-9 Depression Screening    PHQ-9:   Frequency of the following problems over the past two weeks:      Little interest or pleasure in doing things: 0 - not at all  Feeling down, depressed, or hopeless: 0 - not at all  PHQ-2 Score: 0       General Health  · Sleep: sleeps well  · Hearing: normal - bilateral   · Vision: wears glasses and follows with eye doctor in [facility]  · Dental: regular dental visits and follows with dentist in [facility]  /GYN Health  · Last menstrual period: Postmenopausal     Review of Systems:     Review of Systems   Constitutional: Negative for appetite change, chills, fatigue and fever  HENT: Negative for congestion and sore throat  Eyes: Negative for pain  Respiratory: Negative for cough, chest tightness and shortness of breath  Cardiovascular: Negative for chest pain, palpitations and leg swelling  Gastrointestinal: Negative for abdominal pain, constipation, diarrhea, nausea and vomiting  Genitourinary: Negative for difficulty urinating and dysuria  Musculoskeletal: Positive for arthralgias (right knee pain occasionally)  Skin: Negative for rash  Neurological: Negative for dizziness and headaches  Psychiatric/Behavioral: The patient is not nervous/anxious         Past Medical History:     Past Medical History:   Diagnosis Date    Hyperlipidemia     Hypertension       Past Surgical History:     Past Surgical History:   Procedure Laterality Date     SECTION        Social History:     Social History     Socioeconomic History    Marital status: Single     Spouse name: None    Number of children: None    Years of education: None    Highest education level: None   Occupational History    None   Social Needs    Financial resource strain: None    Food insecurity     Worry: None     Inability: None    Transportation needs     Medical: None     Non-medical: None   Tobacco Use    Smoking status: Never Smoker    Smokeless tobacco: Never Used   Substance and Sexual Activity    Alcohol use: Yes     Comment: social    Drug use: No    Sexual activity: None   Lifestyle    Physical activity     Days per week: None     Minutes per session: None    Stress: None   Relationships    Social connections     Talks on phone: None     Gets together: None     Attends Orthodox service: None     Active member of club or organization: None     Attends meetings of clubs or organizations: None     Relationship status: None    Intimate partner violence     Fear of current or ex partner: None     Emotionally abused: None     Physically abused: None     Forced sexual activity: None   Other Topics Concern    None   Social History Narrative    None      Family History:     Family History   Problem Relation Age of Onset    Asthma Mother     Hypertension Mother     COPD Father       Current Medications:     Current Outpatient Medications   Medication Sig Dispense Refill    aspirin (ECOTRIN LOW STRENGTH) 81 mg EC tablet Take 1 tablet (81 mg total) by mouth every 24 hours 90 tablet 3    Cholecalciferol (VITAMIN D) 50 MCG (2000 UT) tablet Take 1 tablet (2,000 Units total) by mouth daily 90 tablet 1    losartan-hydrochlorothiazide (HYZAAR) 100-25 MG per tablet Take 1 tablet by mouth daily 180 tablet 1     No current facility-administered medications for this visit  Allergies: Allergies   Allergen Reactions    No Active Allergies     No Known Allergies       Physical Exam:     /80 (BP Location: Right arm, Patient Position: Sitting, Cuff Size: Adult)   Pulse 98   Temp 97 5 °F (36 4 °C) (Temporal)   Resp 18   Ht 4' 10 5" (1 486 m)   Wt 78 5 kg (173 lb 1 6 oz)   SpO2 99%   BMI 35 56 kg/m²     Physical Exam  Vitals signs and nursing note reviewed     Constitutional:       Appearance: She is well-developed  HENT:      Head: Normocephalic and atraumatic  Right Ear: Tympanic membrane and external ear normal       Left Ear: Tympanic membrane and external ear normal       Nose: Nose normal       Mouth/Throat:      Pharynx: Uvula midline  Eyes:      Extraocular Movements: Extraocular movements intact  Conjunctiva/sclera: Conjunctivae normal       Pupils: Pupils are equal, round, and reactive to light  Neck:      Musculoskeletal: Normal range of motion and neck supple  Cardiovascular:      Rate and Rhythm: Normal rate and regular rhythm  Heart sounds: Normal heart sounds  No murmur  No gallop  Pulmonary:      Effort: Pulmonary effort is normal       Breath sounds: Normal breath sounds  No wheezing  Abdominal:      General: Bowel sounds are normal       Palpations: Abdomen is soft  Tenderness: There is no abdominal tenderness  There is no guarding  Musculoskeletal: Normal range of motion  General: No swelling  Skin:     General: Skin is warm and dry  Neurological:      Mental Status: She is alert and oriented to person, place, and time     Psychiatric:         Mood and Affect: Mood normal          Speech: Speech normal          Behavior: Behavior normal          SURY Herrera

## 2021-01-29 NOTE — PATIENT INSTRUCTIONS
Hipertensión   LO QUE NECESITA SABER:   La hipertensión es la presión arterial teagan  La presión arterial es la fuerza que ejerce la venu al [employer] contra las rosen de las arterias  La hipertensión causa que high presión arterial se eleve tanto que high corazón se ve forzado a trabajar mucho más anibal que lo normal  [place] puede dañar high corazón  Puede que no se conozca la causa de la hipertensión  [place] se denomina hipertensión esencial o primaria  La hipertensión causada por otra afección médica, tales fredis la enfermedad renal, se denomina hipertensión secundaria  INSTRUCCIONES SOBRE [facility]:   Llame al 911 en nasrin de presentar lo siguiente:  · Usted tiene dolor en el pecho  · Tiene alguno de los siguientes signos de un ataque cardíaco:      ? Estrujamiento, presión o tensión en high pecho    ? Usted también podría presentar alguno de los siguientes:     § Malestar o dolor en high espalda, ryan, mandíbula, abdomen, o brazo    § Falta de [employer] o vómitos    § Desvanecimiento o sudor frío repentino    · Usted se siente confundido o tiene dificultad para hablar  · Repentinamente se siente aturdido o con dificultad para respirar  Regrese a la constanza de emergencias si:  · Usted tiene un anibal dolor de camelia o pérdida de la visión  · Usted tiene debilidad en un brazo o en jeyson pierna  Comuníquese con high médico si:  · Usted se siente mareado, confundido, somnoliento o fredis si se fuera a desmayar  · Usted ha estado tomando medicamento para la presión arterial gita todavía está en un nivel más elevado del que high médico le indicó que debería estar  · Usted tiene preguntas o inquietudes acerca de high condición o cuidado  Medicamentos: Es posible que usted necesite alguno de los siguientes:  · Los antihipertensivos podrían usarse para ayudar a disminuir la presión arterial  Varios tipos de medicamentos están disponibles   High médico elegirá medicamentos basados en el tipo de hipertensión que tiene  Es posible que necesite más de un tipo de [employer]  [place] el medicamento exactamente fredis indicado  · Los diuréticos ayudan a eliminar el exceso de líquido que se acumula en el organismo  [place] ayudará a bajar high presión arterial  Es posible que orine más seguido mientras juan carlos kalli medicamento  · Los medicamentos para el colesterol ayudan a bajar los niveles de [place]  Un nivel bajo de colesterol ayuda a prevenir enfermedades cardíacas y facilita el control de la presión arterial     · Durand kaylyn medicamentos fredis se le haya indicado  Consulte con high médico si usted eric que high medicamento no le está ayudando o si presenta efectos secundarios  Infórmele si es alérgico a cualquier medicamento  Mantenga jeyson lista actualizada de los [employer], las vitaminas y los productos herbales que juan carlos  Incluya los siguientes datos de los medicamentos: cantidad, frecuencia y motivo de administración  Traiga con usted la lista o los envases de las píldoras a kaylyn citas de seguimiento  Lleve la lista de los medicamentos con usted en nasrin de jeyson emergencia  Acuda a kaylyn consultas de control con high médico según le indicaron  Usted necesitará regresar para medir high presión arterial y realizar otros exámenes de laboratorio  Anote kaylyn preguntas para que se acuerde de hacerlas bri kaylyn visitas  [place] de la hipertensión:      · Myrna Ache presión arterial normal es 119/79 o inferior   High médico podría comprobar high presión arterial solamente cada año si se mantiene en un nivel normal     · Jeyson presión arterial elevada es de 120/79 a 129/79   A veces esto se llama prehipertensión  High médico puede sugerir cambios de estilo de dank para ayudar a bajar la presión arterial a un nivel normal  Entonces él podría comprobar high presión otra vez en 3 a 6 meses  · La etapa 1 de la hipertensión es de 130/80  a 139/89    High médico podría recomendar cambios de estilo de dank, medicamentos y controles cada 3 a 6 meses hasta que high presión arterial esté controlada  · La etapa 2 de la hipertensión es de 140/90 o mayor   High médico le recomendará cambios en el estilo de dank y le indicará 2 clases de medicamentos para la hipertensión  También necesitará controlar high presión arterial cada mes hasta que esté controlada  Maneje high hipertensión:  · Controle high presión arterial en casa  Evite fumar, consumir cafeína y hacer ejercicio al menos 30 minutos antes de controlar high presión arterial  Siéntese y descanse por 5 minutos antes de tomarse la presión arterial  Extienda high brazo y apóyelo en jeyson superficie plana  High brazo debe estar a la misma altura que high corazón  Siga las instrucciones que vienen con el monitor para la presión arterial  Controle high presión arterial 2 veces, con diferencia de 1 minuto, antes de dawson high medicamento por la mañana  También controle high presión arterial antes de la trudi  Mantenga un registro de high peso y llévelo con usted a las citas de control  Pregúntele a high médico cuál debería ser high presión arterial          · Controle cualquier otra condición médica que usted tenga  Algunas condiciones médicas fredis la diabetes pueden aumentar high riesgo de hipertensión  [address] high médico y tómese kaylyn medicamentos según dichas instrucciones  · Pregunte [employer]  Ciertos medicamentos pueden aumentar high presión arterial  Los ejemplos incluyen las píldoras anticonceptivas orales, los descongestivos, los suplementos herbales y los [place], fredis el ibuprofeno  High médico puede indicarle qué medicamentos son seguros para usted  Estos medicamentos incluyen los recetados y de [place]  Cambios de estilo de dank que usted puede hacer para manejar la hipertensión:  · Limite el sodio (la sal) fredis se le haya indicado  Demasiado sodio puede afectar el equilibrio de líquidos  Revise las etiquetas para buscar alimentos bajos en sodio o sin kyra agregada   Algunos alimentos bajos en sodio utilizan sales de potasio para añadir sabor  Demasiado potasio también puede causar problemas de [place]  High médico le dirá qué cantidad de sodio y potasio es childs para el consumo en un día  Él puede recomendarle que limite el sodio a 2,300 mg al día  · Siga el plan de comidas recomendado por high médico  Un dietista o médico puede darle más información sobre planes de bajo contenido de sodio o el plan de alimentación DASH (enfoques dietéticos para detener la hipertensión)  El plan DASH es bajo en sodio, grasas saturadas y grasa total  Es alto en potasio, calcio y Samantha  · Ejercítese para mantener un peso saludable  Realice actividad física por lo menos 30 minutos al día, la mayoría de los días de la [place]  Furman ayudará a bajar high presión arterial  Pregunte a high médico acerca del mejor plan de ejercicio para usted  · [address] [facility] estr[address]  Es posible que esto contribuya a bajar high presión arterial  Aprenda sobre formas de [place], fredis respiración profunda o escuchar música  · Limite el consumo de alcohol según le indicaron  El alcohol puede aumentar la presión arterial  Un trago equivale a 12 onzas de cerveza, 5 onzas de vino o 1 onza y ½ de licor  · No fume  La nicotina y otros químicos en los cigarrillos y cigarros pueden aumentar high presión arterial y también provocar daño al pulmón  Pida información a high médico si usted actualmente fuma y necesita ayuda para dejar de fumar  Los cigarrillos electrónicos o el tabaco sin humo igualmente contienen nicotina  Consulte con high médico antes de [employer]  © Copyright [address] Information is for End User's use only and may not be sold, redistributed or otherwise used for commercial purposes  All illustrations and images included in CareNotes® are the copyrighted property of James JAMES  or [address] [place] [address] es sólo para uso en educación   High intención no es darle un consejo [employer] enfermedades o tratamientos  Colsulte con high Kamryn Merl farmacéutico antes de seguir cualquier régimen médico para saber si es seguro y efectivo para usted

## 2022-03-16 ENCOUNTER — APPOINTMENT (OUTPATIENT)
Dept: LAB | Facility: HOSPITAL | Age: 67
End: 2022-03-16
Payer: MEDICARE

## 2022-03-16 ENCOUNTER — HOSPITAL ENCOUNTER (OUTPATIENT)
Dept: RADIOLOGY | Facility: HOSPITAL | Age: 67
Discharge: HOME | End: 2022-03-16
Payer: MEDICARE

## 2022-03-16 ENCOUNTER — OFFICE VISIT (OUTPATIENT)
Dept: FAMILY MEDICINE CLINIC | Facility: CLINIC | Age: 67
End: 2022-03-16

## 2022-03-16 VITALS
HEIGHT: 59 IN | DIASTOLIC BLOOD PRESSURE: 80 MMHG | RESPIRATION RATE: 16 BRPM | TEMPERATURE: 98 F | SYSTOLIC BLOOD PRESSURE: 160 MMHG | OXYGEN SATURATION: 97 % | WEIGHT: 164 LBS | BODY MASS INDEX: 33.06 KG/M2 | HEART RATE: 92 BPM

## 2022-03-16 DIAGNOSIS — M54.42 ACUTE LEFT-SIDED LOW BACK PAIN WITH LEFT-SIDED SCIATICA: ICD-10-CM

## 2022-03-16 DIAGNOSIS — I10 ESSENTIAL HYPERTENSION: Primary | ICD-10-CM

## 2022-03-16 DIAGNOSIS — E66.09 CLASS 1 OBESITY DUE TO EXCESS CALORIES WITHOUT SERIOUS COMORBIDITY WITH BODY MASS INDEX (BMI) OF 33.0 TO 33.9 IN ADULT: ICD-10-CM

## 2022-03-16 DIAGNOSIS — Z12.11 COLON CANCER SCREENING: ICD-10-CM

## 2022-03-16 DIAGNOSIS — M54.42 ACUTE LEFT-SIDED LOW BACK PAIN WITH LEFT-SIDED SCIATICA: Primary | ICD-10-CM

## 2022-03-16 DIAGNOSIS — Z13.31 DEPRESSION SCREEN: ICD-10-CM

## 2022-03-16 DIAGNOSIS — I10 ESSENTIAL HYPERTENSION: ICD-10-CM

## 2022-03-16 LAB
ALBUMIN SERPL BCP-MCNC: 4.7 G/DL (ref 3–5.2)
ALP SERPL-CCNC: 87 U/L (ref 43–122)
ALT SERPL W P-5'-P-CCNC: 23 U/L
ANION GAP SERPL CALCULATED.3IONS-SCNC: 9 MMOL/L (ref 5–14)
AST SERPL W P-5'-P-CCNC: 28 U/L (ref 14–36)
BASOPHILS # BLD AUTO: 0 THOUSANDS/ΜL (ref 0–0.1)
BASOPHILS NFR BLD AUTO: 1 % (ref 0–1)
BILIRUB SERPL-MCNC: 0.74 MG/DL
BUN SERPL-MCNC: 16 MG/DL (ref 5–25)
CALCIUM SERPL-MCNC: 9.8 MG/DL (ref 8.4–10.2)
CHLORIDE SERPL-SCNC: 98 MMOL/L (ref 97–108)
CHOLEST SERPL-MCNC: 277 MG/DL
CO2 SERPL-SCNC: 30 MMOL/L (ref 22–30)
CREAT SERPL-MCNC: 0.76 MG/DL (ref 0.6–1.2)
EOSINOPHIL # BLD AUTO: 0.1 THOUSAND/ΜL (ref 0–0.4)
EOSINOPHIL NFR BLD AUTO: 4 % (ref 0–6)
ERYTHROCYTE [DISTWIDTH] IN BLOOD BY AUTOMATED COUNT: 13.3 %
GFR SERPL CREATININE-BSD FRML MDRD: 82 ML/MIN/1.73SQ M
GLUCOSE P FAST SERPL-MCNC: 115 MG/DL (ref 70–99)
HCT VFR BLD AUTO: 40.1 % (ref 36–46)
HDLC SERPL-MCNC: 81 MG/DL
HGB BLD-MCNC: 13.8 G/DL (ref 12–16)
LDLC SERPL CALC-MCNC: 142 MG/DL
LYMPHOCYTES # BLD AUTO: 1.5 THOUSANDS/ΜL (ref 0.5–4)
LYMPHOCYTES NFR BLD AUTO: 37 % (ref 25–45)
MCH RBC QN AUTO: 28.8 PG (ref 26–34)
MCHC RBC AUTO-ENTMCNC: 34.5 G/DL (ref 31–36)
MCV RBC AUTO: 84 FL (ref 80–100)
MONOCYTES # BLD AUTO: 0.4 THOUSAND/ΜL (ref 0.2–0.9)
MONOCYTES NFR BLD AUTO: 10 % (ref 1–10)
NEUTROPHILS # BLD AUTO: 2 THOUSANDS/ΜL (ref 1.8–7.8)
NEUTS SEG NFR BLD AUTO: 49 % (ref 45–65)
NONHDLC SERPL-MCNC: 196 MG/DL
PLATELET # BLD AUTO: 320 THOUSANDS/UL (ref 150–450)
PMV BLD AUTO: 8.7 FL (ref 8.9–12.7)
POTASSIUM SERPL-SCNC: 3.9 MMOL/L (ref 3.6–5)
PROT SERPL-MCNC: 8.8 G/DL (ref 5.9–8.4)
RBC # BLD AUTO: 4.8 MILLION/UL (ref 4–5.2)
SODIUM SERPL-SCNC: 137 MMOL/L (ref 137–147)
TRIGL SERPL-MCNC: 269 MG/DL
WBC # BLD AUTO: 4.1 THOUSAND/UL (ref 4.5–11)

## 2022-03-16 PROCEDURE — 80061 LIPID PANEL: CPT

## 2022-03-16 PROCEDURE — 72110 X-RAY EXAM L-2 SPINE 4/>VWS: CPT

## 2022-03-16 PROCEDURE — 36415 COLL VENOUS BLD VENIPUNCTURE: CPT

## 2022-03-16 PROCEDURE — 80053 COMPREHEN METABOLIC PANEL: CPT

## 2022-03-16 PROCEDURE — 85025 COMPLETE CBC W/AUTO DIFF WBC: CPT

## 2022-03-16 PROCEDURE — 99214 OFFICE O/P EST MOD 30 MIN: CPT | Performed by: PHYSICIAN ASSISTANT

## 2022-03-16 RX ORDER — LOSARTAN POTASSIUM AND HYDROCHLOROTHIAZIDE 25; 100 MG/1; MG/1
1 TABLET ORAL DAILY
Qty: 90 TABLET | Refills: 1 | Status: SHIPPED | OUTPATIENT
Start: 2022-03-16

## 2022-03-16 RX ORDER — METHOCARBAMOL 500 MG/1
500 TABLET, FILM COATED ORAL 2 TIMES DAILY PRN
Qty: 60 TABLET | Refills: 1 | Status: SHIPPED | OUTPATIENT
Start: 2022-03-16

## 2022-03-16 NOTE — ASSESSMENT & PLAN NOTE
-  Systolic blood pressure is slightly elevated today, however patient notes at home her blood pressure readings have been ranging around 130/80  Patient does admit to being nervous today  - Will continue with current medication regimen-losartan -hydrochlorothiazide 100-25 mg daily   - Reviewed BP target goal with patient  - Advised to continue checking blood pressure a few times a week and to contact the office if blood pressure is consistently >140/90   - Continue to maintain healthy balanced diet with focus on low salt intake  Limit alcohol intake  - Advised to exercise at least 30 minutes a day for at least 5 days out of the week

## 2022-03-16 NOTE — PATIENT INSTRUCTIONS
Detección de cáncer colorrectal   CUIDADO AMBULATORIO:   La detección de cáncer colorrectal significa que se lo examina aunque no tenga signos o síntomas  El cáncer colorrectal es la tercera causa principal de muerte en los [place] y [place]  La prueba de detección regular sirve para encontrar problemas a tiempo y comenzar el tratamiento  El tratamiento temprano puede salvarle la dank  Tipos de prueba de detección disponibles: High médico hablará con usted sobre los beneficios y riesgos de cada tipo de prueba de detección: Trabajará con usted para decidir qué prueba de detección es mejor para usted  También le dirá cuándo debe empezar a realizarse la prueba de detección  · El examen de venu oculta en heces (FOBT) puede recolectarse en casa  El examen de venu oculta en heces busca la presencia de venu en kaylyn evacuaciones intestinales  Necesitará jeyson pequeña muestra de 2 a 3 evacuaciones intestinales  High médico le dará tarjetas especiales para colocar la muestra  Usted devolverá las tarjetas al consultorio de high médico o a un laboratorio para que le treva la prueba  Si high prueba es positiva para la presencia de [place], usted necesitará hacerse jeyson colonoscopia  Jeyson colonoscopia o sigmoidoscopia flexible puede ayudar a encontrar de dónde proviene [place] [employer]  La venu puede significar que usted tiene pólipos, cáncer u otras afecciones, fredis hemorroides  Es posible que tenga que evitar ciertos medicamentos y alimentos bri aproximadamente 3 días antes de dawson la [place]  High médico le dirá qué medicamentos y alimentos debe evitar  Si no se encuentra venu, es posible que tenga que hacerse la prueba el próximo [place]  · Jeyson prueba inmunoquímica fecal (FIT) también se recoge en casa y luego se envía a un laboratorio para high análisis  La prueba inmunoquímica fecal también examina la presencia de venu en las evacuaciones intestinales   Usted no tiene que evitar ningún medicamento o alimento para hacerse la prueba inmunoquímica fecal  Usted recoge [place] de jeyson evacuación intestinal y la coloca en un recipiente especial  El recipiente se envía por correo o se lleva al consultorio o laboratorio de high médico  Si se encuentra venu, es posible que necesite jeyson colonoscopia o jeyson sigmoidoscopia flexible  Si no se encuentra venu, es posible que tenga que hacerse la prueba el próximo [place]  · Jeyson sigmoidoscopia es un procedimiento para sylwia dentro del recto y el colon sigmoide  El colon sigmoide es la parte inferior de los intestinos, la más cercana al recto  Se le insertará un sigmoidoscopio en el recto  Kalli es un tubo con Orin Shillings ceci y jeyson Herndon Margo en el extremo  Imágenes del colon aparecen en un monitor bri el procedimiento  Jeyson sigmoidoscopia flexible podría ayudar a diagnosticar cáncer de colon, inflamación, pólipos (crecimientos) o infecciones  · Jeyson colonoscopia es un procedimiento para examinar con un endoscopio el interior de high colon (intestino)  La sonda es un tubo flexible con jeyson ceci pequeña y Judy Millin en la punta  Bri jeyson colonoscopia, es posible que le retiren pólipos o crecimientos de tejidos para analizarlos y detectar la presencia de cáncer  · [place] colonoscopia virtual es un tipo de examen que utiliza radiografías para examinar el interior del colon (intestino grueso)  Los médicos utilizan jeyson tomografía computarizada o imágenes por resonancia magnética para dawson imágenes del colon desde fuera de high cuerpo  Necesitará ingerir líquido de contraste para que las imágenes se aprecien mejor  Kalli procedimiento se [place] para determinar si tiene pólipos (crecimientos) o cáncer  También puede controlar el tamaño de los pólipos  Es posible que necesite kalli procedimiento para comprobar si el cáncer colorrectal ha vuelto a aparecer después del tratamiento  Se puede utilizar jeyson colonoscopia virtual si no es capaz de someterse jeyson colonoscopia regular      · El ADN en high evacuación intestinal puede analizarse para detectar cambios genéticos  Los cambios genéticos pueden ser un signo de cáncer colorrectal     Con qué frecuencia puede necesitar pruebas de detección de cáncer colorrectal: Se recomiendan las pruebas de detección a partir de los 48 años y [place] 76 si se tiene un riesgo promedio  High médico puede sugerirle que se someta a las pruebas de detección a partir de los [facility]  Las pruebas de detección pueden comenzar antes de los 39 años o continuar después de los 76 si high riesgo es alto  High médico le enseñará con qué frecuencia debe hacerse las pruebas de detección  El momento depende del tipo de detección y si se encontraron pólipos u otros problemas  El momento también depende de high edad y de si usted tiene mayor riesgo de tener cáncer  Es posible que necesite hacerse las pruebas de detección cada 1, 2, 5 o 10 años  Riesgos de la detección del cáncer colorrectal: Cualquier tipo de prueba de detección tiene riesgos  Hable con high médico sobre los riesgos de la detección  Es posible que [employer] indique lo siguiente:  · Puede ocurrir un resultado falso-negativo  El resultado puede retrasar el tratamiento porque muestra que no se encontró cáncer  Puede hacer que usted no reciba tratamiento incluso cuando tenga síntomas  · Puede ocurrir un resultado falso positivo  Karime resultado puede hacer que le treva más pruebas  También puede hacer que se sienta ansioso si eric que tiene cáncer  © Copyright [employer] 2022 Information is for End User's use only and may not be sold, redistributed or otherwise used for commercial purposes  All illustrations and images included in CareNotes® are the copyrighted property of [employer]  or [address] [place] [address] es sólo para uso en educación  High intención no es darle un consejo médico sobre enfermedades o tratamientos   Colsulte con high Micaela Seals farmacéutico antes de seguir cualquier régimen médico para saber si es seguro y efectivo para usted  Visita de bienestar para los adultos   CUIDADO AMBULATORIO:   Jeyson visita de bienestar es cuando usted acude con un médico para que le treva exámenes de detección de problemas de [place]  High médico también le dará consejos sobre cómo mantenerse saludable  Anote kaylyn preguntas para que se acuerde de hacerlas  Pregunte a high médico con qué frecuencia debería realizarse jeyson visita de bienestar  Lo que sucede en jeyson visita de bienestar: High médico le preguntará sobre high emile y high historia familiar [id number] [facility]  [place] incluye presión arterial coco, enfermedades del corazón y cáncer  El médico le preguntará si tiene síntomas que le preocupen, si [facility] y [place] de ánimo  También se le preguntará acerca del uso de medicamentos, suplementos, alimentos y alcohol  Es posible que le treva cualquiera de lo siguiente:  · High peso será revisado  Es posible que [facility] midan high altura para calcular high índice de masa corporal [facility])  El [facility] indica si tiene un peso saludable  · Se verificarán high presión arterial y frecuencia cardíaca  También pueden revisar high temperatura  · Exámenes de [place] y [facility] podría realizarse  Se podrían realizar exámenes de venu para revisar los niveles de [facility]  Los niveles anormales de colesterol aumentan el riesgo de enfermedad del corazón y accidente cerebrovascular  Puede que también necesite jeyson prueba de venu u orina para revisar si tiene diabetes si usted está en mayor riesgo  Las pruebas de orina pueden hacerse para buscar signos de jeyson infección o jeyson enfermedad renal     · Un examen físico incluye la comprobación de kaylyn latidos del corazón y los pulmones con un estetoscopio  High médico también podría revisarle la piel para buscar daños causados por el sol  · Pruebas de detección podría recomendarse  Se realiza un examen de detección para detectar enfermedades que pueden no causar síntomas   Los exámenes de detección que necesite dependen de high edad, género, antecedentes familiares y hábitos de dank  Por ejemplo, podrían recomendarle la exploración selectiva colorrectal si tiene 48 años o más  Si es [place], necesita los siguientes exámenes de detección:  · El examen de Papanicolaou se utiliza para detectar cáncer de ryan uterino  El examen del Papanicolaou por lo general se realiza entre 3 a 5 años dependiendo de high edad  Puede necesitarlo más a menudo si usted ha tenido [employer] de la prueba de Papanicolaou en el pasado  Pregunte a high médico con qué frecuencia debería realizarse un examen de Papanicolaou  · Naa Roch es jeyson radiografía de kaylyn mamas para detectar cáncer de mama  Los expertos recomiendan [address] cada 2 años empezando a los 48 años de [place]  Es probable que usted necesite Naa Roch a los 52 años o antes si tiene riesgo alto de cáncer de seno  Hable con high médico sobre cuándo debe empezar con kaylyn mamografías y con cuánta frecuencia las necesita  Vacunas que podría necesitar:  · Debe recibir jeyson vacuna contra la influenza todos los [place]  La vacuna contra la influenza protege de la gripe  Varios tipos de virus causan la influenza  Debido a que los virus [place] con el [place], es necesaria la producción de nuevas vacunas cada año  · Debe recibir [place] N[place]da vacuna de refuerzo contra el tétanos-difteria (Td) cada 10 años  Esta vacuna protege contra el tétanos y la difteria  El tétanos es jeyson infección severa que puede causar trismo y espasmos musculares dolorosos  La difteria es jeyson infección bacteriana grave que produce jeyson cubierta gruesa en la parte de atrás de la boca y garganta  · Debe recibir la vacuna contra el virus del papiloma humano (VPH) si usted es jason y [place] 19 y 32 años o es hombre y [place] 23 y 24 años y nunca la recibió  Esta vacuna protege contra la infección por VPH   El virus del papiloma humano o VPH es la infección más común que se transmite por contacto sexual  [place] virus del papiloma humano también podría provocar cáncer vaginal, del pene y del ano  · Debe recibir la vacuna antineumocócica si tiene más de 72 años  La vacuna antineumocócica es jeyson inyección que se administra para protegerlo contra jeyson enfermedad neumocócica  La enfermedad neumocócica es jeyson infección causada por la bacteria neumocócica  La infección puede causar neumonía, meningitis o jeyson infección del oído  · Debe recibir la vacuna contra la culebrilla Si tiene 61 años o más, incluso si ha tenido culebrilla antes  La vacuna contra la culebrilla (herpes zóster) es jeyson inyección usada para proteger contra el virus zóster que causa la varicela  Karime es el mismo virus que causa la varicela  La culebrilla es un sarpullido doloroso que se desarrolla en personas que tuvieron varicela o stout estado expuestas al virus  Cómo comer saludable: [place] es un modelo para planear comidas sanas  Muestra los tipos y cantidades de alimentos que deberían ir en un plato  Mercedes Noman y verduras representan alrededor de la mitad de high plato y los granos y proteínas representan la otra mitad  Se incluye jeyson porción de productos lácteos al lado del plato  La cantidad de calorías y [address] de las porciones que usted necesita dependen de high edad, [place], peso y altura  Los ejemplos de alimentos saludables son:  · Consuma jeyson variedad de verduras fredis las de color ludin oscuro, velez y [place]  Usted también puede incluir verduras enlatadas bajas en sodio (sal) y verduras congeladas sin mantequilla ni salsas [id number]  · Consuma jeyson variedad de fruta frescas , las frutas enlatadas en 100% de jugo, fruta [place] y sandra secos  · Incluya granos enteros  Por lo menos la mitad de los granos que usted consume deben ser granos de juliet integral  Por ejemplo, panes de grano entero, pasta integral, arroz integral y cereales de grano entero fredis la dana      · Consuma jeyson variedad de alimentos con proteínas fredis mariscos (pescado y crustáceos), [place] y carnjackie de ave sin piel (pavo y giovanni)  Ejemplos de berna magras incluyen pierna, paleta o lomo de puerco y lama rosa, solomillo o, lomo de res y carne [place] de res extra [place]  Otros alimentos ricos en proteínas son los huevos y sustitutos de [place], frijoles, chícharos, productos de soya, nueces y semillas  · Elija productos lácteos bajos en grasa [employer] o del 1% o yogur, queso y requesón bajos en grasa  · Limite las grasas poco saludables, fredis la [place], la margarina dura y la [place]  Ejercicio: Realice jeyson actividad física por lo menos 30 minutos al día, la mayoría de los días de la [place]  Algunos de los ejercicios incluyen caminar, montar en bicicleta, bailar y nadar  Usted también puede realizar más actividad física usando las escaleras en vez de los ascensores o estacionarse más lejos cuando Gary Alejandra a las tiendas  Incluya ejercicios para fortalecer los músculos 2 días a la semana  El ejercicio regular proporciona muchos beneficios para la emile  Alline Mems a controlar high peso y [employer] riesgo de diabetes tipo 2, presión arterial coco, enfermedad del corazón y accidente cerebrovascular  El ejercicio [employer] puede ayudar a mejorar high estado de ánimo  Pregunte a high médico acerca del mejor plan de ejercicio para usted  Pautas generales de emile y seguridad:  · No fume  La nicotina y otras sustancias químicas que contienen los cigarrillos y cigarros pueden dañar los pulmones  Pida información a high médico si usted actualmente fuma y necesita ayuda para dejar de fumar  Los cigarrillos electrónicos o el tabaco sin humo igualmente contienen nicotina  Consulte con high médico antes de [employer]  · Limite el consumo de alcohol  Un trago equivale a 12 onzas de cerveza, 5 onzas de vino o 1 onza y ½ de licor  · Baje de peso, si es necesario  El sobrepeso aumenta el riesgo de ciertas condiciones de [place] enfermedad del corazón, presión arterial coco, diabetes tipo 2 y ciertos tipos de cáncer  · Proteja high piel  No tome el sol ni use adalgisa de bronceado  Use un protector solar con un FPS mayor a 13  Aplíquese el bloqueador por lo menos 15 minutos antes de que vaya a estar al [employer]  Vuelva a aplicarse la crema solar cada 2 horas  Use ropa protectora, sombrero y lentes para el sol cuando se encuentre afuera  · Conduzca con seguridad  Use siempre high cinturón de seguridad  Asegúrese que todos en el teressa usan el cinturón de seguridad  Un cinturón de seguridad puede salvar high dank en nasrin de un accidente automovilístico  No use el celular cuando esté manejando  [place] puede hacer que se distraiga y causar un accidente  Es mejor que pare y se orille si necesita hacer jeyson Scherrie Rile un texto  · Practique el sexo seguro  Use condones de látex si es sexualmente [place] y tiene más de [place]  High médico puede recomendar exámenes de detección de infecciones de transmisión sexual (ITS)  · Use un carl, un chaleco salvavidas y unos implementos de protección  Siempre use un carl al [employer] en bicicleta o motocicleta, esquiar o jugar deportes que podrían causar jeyson lesión en la camelia  Use implementos de protección cuando practique deportes  Use un chaleco salvavidas cuando esté en un bote o practicando actividades acuáticas  © Copyright [employer] 2022 Information is for End User's use only and may not be sold, redistributed or otherwise used for commercial purposes  All illustrations and images included in CareNotes® are the copyrighted property of A [employer] [address] es sólo para uso en educación  High intención no es darle un consejo médico sobre enfermedades o tratamientos  Colsulte con high Gala Primer farmacéutico antes de seguir cualquier régimen médico para saber si es seguro y efectivo para usted        Hipertensión   LO QUE NECESITA SABER:   Tino Graff hipertensión es la presión arterial teagan  La presión arterial es la fuerza que ejerce la venu al [employer] contra las rosen de las arterias  La hipertensión causa que swift presión arterial se eleve tanto que swift corazón se ve forzado a trabajar mucho más anibal que lo normal  [place] puede dañar swift corazón  Puede que no se conozca la causa de la hipertensión  [place] se denomina hipertensión esencial o primaria  La hipertensión causada por otra afección médica, tales fredis la enfermedad renal, se denomina hipertensión secundaria  INSTRUCCIONES SOBRE EL [facility]:   Llame al [employer] de emergencias (911 en [address]) o pídale a alguien que llame si:  · Usted tiene dolor en el pecho  · Tiene alguno de los siguientes signos de un ataque cardíaco:      ? Estrujamiento, presión o tensión en swift pecho    ? Usted también podría presentar alguno de los siguientes:     § Malestar o dolor en swift espalda, ryan, mandíbula, abdomen, o brazo    § Falta de [employer] o vómitos    § Desvanecimiento o sudor frío repentino    · Usted se siente confundido o tiene dificultad para hablar  · Repentinamente se siente aturdido o con dificultad para respirar  Regrese a la constanza de emergencias si:  · Usted tiene un anibal dolor de camelia o pérdida de la visión  · Usted tiene debilidad en un brazo o en jeyson pierna  Llame a swift médico o cardiólogo si:  · Usted se siente mareado, confundido, somnoliento o fredis si se fuera a desmayar  · Usted ha estado tomando medicamento para la presión arterial gita todavía está en un nivel más elevado del que swift médico le indicó que debería estar  · Usted tiene preguntas o inquietudes acerca de swift condición o cuidado  Medicamentos: Es posible que usted necesite alguno de los siguientes:  · Los antihipertensivos podrían usarse para ayudar a disminuir la presión arterial  Varios tipos de medicamentos están disponibles   Swift médico elegirá medicamentos basados en el tipo de hipertensión que tiene  Es posible que necesite más de un tipo de [place]  [place] el medicamento exactamente fredis indicado  · Los diuréticos ayudan a eliminar el exceso de líquido que se acumula en el organismo  [place] ayudará a bajar high presión arterial  Es posible que orine más seguido mientras juan carlos kalli medicamento  · Los medicamentos para el colesterol ayudan a bajar los niveles de [place]  Un nivel bajo de colesterol ayuda a prevenir enfermedades cardíacas y facilita el control de la presión arterial     · [place] kaylyn medicamentos fredis se le haya indicado  Consulte con high médico si usted eric que high medicamento no le está ayudando o si presenta efectos secundarios  Infórmele si es alérgico a cualquier medicamento  Mantenga jeyson lista actualizada de los [place], las vitaminas y los productos herbales que juan carlos  Incluya los siguientes datos de los medicamentos: cantidad, frecuencia y motivo de administración  Traiga con usted la lista o los envases de las píldoras a kaylyn citas de seguimiento  Lleve la lista de los medicamentos con usted en nasrin de jeyson emergencia  Acuda a kaylyn consultas de control con high médico o cardiólogo según le indicaron: Usted necesitará regresar para medir high presión arterial y realizar otros exámenes de laboratorio  Anote kaylyn preguntas para que se acuerde de hacerlas bri kaylyn visitas  [place] de la hipertensión:      · [place] presión arterial normal es 119/79 o inferior   High médico podría comprobar high presión arterial solamente cada año si se mantiene en un nivel normal     · Jeyson presión arterial elevada es de 120/79 a 129/79   A veces esto se llama prehipertensión  High médico puede sugerir cambios de estilo de dank para ayudar a bajar la presión arterial a un nivel normal  Entonces él podría comprobar high presión otra vez en 3 a 6 meses  · La etapa 1 de la hipertensión es de 130/80  a 139/89    High médico podría recomendar cambios de estilo de dank, medicamentos y controles cada 3 a 6 meses hasta que high presión arterial esté controlada  · La etapa 2 de la hipertensión es de 140/90 o mayor   High médico le recomendará cambios en el estilo de dank y le indicará 2 clases de medicamentos para la hipertensión  También necesitará controlar high presión arterial cada mes hasta que esté controlada  Maneje high hipertensión:  · Controle high presión arterial en casa  Evite fumar, consumir cafeína y hacer ejercicio al menos 30 minutos antes de controlar high presión arterial  Siéntese y descanse por 5 minutos antes de tomarse la presión arterial  Extienda high brazo y apóyelo en jeyson superficie plana  High brazo debe estar a la misma altura que high corazón  Siga las instrucciones que vienen con el monitor para la presión arterial  Controle high presión arterial 2 veces, con diferencia de 1 minuto, antes de dawson high medicamento por la mañana  También controle high presión arterial antes de la trudi  Mantenga un registro de high peso y llévelo con usted a las citas de control  Pregúntele a high médico cuál debería ser high presión arterial          · Controle cualquier otra condición médica que usted tenga  Algunas condiciones médicas fredis la diabetes pueden aumentar high riesgo de hipertensión  [address] high médico y tómese kaylyn medicamentos según dichas instrucciones  · Pregunte [employer]  Ciertos medicamentos pueden aumentar high presión arterial  Los ejemplos incluyen las píldoras anticonceptivas orales, los descongestivos, los suplementos herbales y los [place], fredis el ibuprofeno  High médico puede indicarle qué medicamentos son seguros para usted  Estos medicamentos incluyen los recetados y de [place]  Cambios de estilo de dank que usted puede hacer para manejar la hipertensión: High médico puede recomendarle que trabaje con un equipo para controlar la hipertensión   El equipo puede incluir expertos médicos, fredis un dietista, un fisioterapeuta o un terapeuta del comportamiento  Los [employer] de high airam pueden participar en la creación de cambios en el estilo de dank  · Limite el sodio (la sal) fredis se le haya indicado  Demasiado sodio puede afectar el equilibrio de líquidos  Revise las etiquetas para buscar alimentos bajos en sodio o sin sal agregada  Algunos alimentos bajos en sodio utilizan sales de potasio para añadir sabor  Demasiado potasio también puede causar problemas de [place]  High médico le dirá qué cantidad de sodio y potasio es childs para el consumo en un día  Puede recomendarle que limite el sodio a 2,300 mg al día  · Siga el plan de comidas recomendado por high médico  Un dietista o médico puede darle más información sobre planes de bajo contenido de sodio o el plan de alimentación DASH (enfoques dietéticos para detener la hipertensión)  El plan DASH es bajo en sodio, azúcar procesada, grasas dañinas y grasas totales  Es alto en potasio, calcio y [place]  Estos se encuentran en las verduras, las frutas y los alimentos integrales  · Manténgase físicamente activo bri todo el día  La actividad física, fredis el ejercicio, puede ayudar a controlar high presión arterial y high peso  Pily actividad física por lo menos 30 minutos al día, la mayoría de los días de la [place]  Incluya jeyson actividad aeróbica, fredis caminar o montar en bicicleta  Incluya también entrenamiento de fuerza al menos 2 veces por semana  [place] médicos pueden ayudarle a crear un plan de [place] [address] estrés  Es posible que esto contribuya a bajar high presión arterial  Aprenda sobre formas de [place], fredis respiración profunda o escuchar música  · Limite el consumo de alcohol según le indicaron  El alcohol puede aumentar la presión arterial  Un trago equivale a 12 onzas de cerveza, 5 onzas de vino o 1 onza y ½ de licor  · No fume   La nicotina y otros químicos en los cigarrillos y cigarros pueden aumentar high presión arterial y también provocar daño al pulmón  Pida información a high médico si usted actualmente fuma y necesita ayuda para dejar de fumar  Los cigarrillos electrónicos o el tabaco sin humo igualmente contienen nicotina  Consulte con high médico antes de [employer]  © Copyright [employer] 2022 Information is for End User's use only and may not be sold, redistributed or otherwise used for commercial purposes  All illustrations and images included in CareNotes® are the copyrighted property of A D A M [employer]  or [address] [place] [address] es sólo para uso en educación  High intención no es darle un consejo médico sobre enfermedades o tratamientos  Colsulte con high [place] farmacéutico antes de seguir cualquier régimen médico para saber si es seguro y efectivo para usted  Ejercicios para la espalda baja   LO QUE NECESITA SABER:   Los ejercicios de la espalda baja ayudan a sanar y a fortalecer los músculos de high espalda para evitar otra lesión  Pregúntele a high médico si usted necesita acudir con un fisioterapeuta para que le indique ejercicios más avanzado  INSTRUCCIONES SOBRE [facility]:   Regrese a la constanza de emergencias si:  · Usted tiene dolor severo que le impide moverse  Comuníquese con high médico si:  · High dolor empeora  · Usted tiene un dolor nuevo  · Usted tiene preguntas o inquietudes acerca de high condición o cuidado  Realice los ejercicios para la espalda baja de manera childs:  · Pily kaylyn ejercicios sobre jeyson colchoneta o superficie firme (no en la cama) para alejandrina soporte a la columna y evitar dolor en la parte baja de la espalda  · Muévase lenta y suavemente  Evite movimientos rápidos o bruscos  · Respire normalmente  No contenga la respiración  · Deténgase si siente dolor  Es normal que sienta cierta molestia al principio  Practicar los ejercicios con regularidad ayudará a disminuir high incomodidad con el paso del [place]  Ejercicios para la espalda baja:  High médico podría recomendarle que realice ejercicios para la espalda de 10 a 30 minutos cada día  También podría recomendarle que pily ejercicios 1 a 3 veces cada día  Pregunte a high médico cuáles ejercicios son los mejores para usted y con qué frecuencia hacerlos  · Bombeo del tobillo: Acuéstese boca arriba  Levante high pie (con kaylyn dedos apuntando hacia high camelia)  Luego, baje high pie (con los dedos apuntando lejos de usted)  Repita kalli ejercicio 10 veces en cada lado  · Deslizamiento de talón: Acuéstese boca arriba  Muy despacio doble jeyson pierna y luego enderécela  Luego, doble la otra pierna y enderécela  Repita 10 veces en cada lado  · Inclinación pélvica: Acuéstese boca arriba con kaylyn rodillas dobladas y kaylyn pies planos sobre el piso  Coloque kaylyn brazos en jeyson posición relajada junto a high cuerpo  Contraiga los músculos de high abdomen y aplane high espalda contra el piso  Sostenga está posición por 5 segundos  Repita 5 veces  · Estiramiento de la espalda: Acuéstese boca arriba con kaylyn mercedes detrás de high camelia  Doble kaylyn rodillas y gire la mitad de high cuerpo hacia un lado  Mantenga esta posición por 10 segundos  Repita 3 veces en cada lado  · Levantamiento de la pierna estirada: Acuéstese boca [place]harath con jeyson pierna estirada  Doble la otra rodilla  Contraiga high abdomen y luego levante lentamente la pierna estirada entre 6 a 12 pulgadas del piso  Mantenga esta posición por 1 a 5 segundos  Baje high pierna lentamente  Repita 10 veces en cada pierna  · Rodillas al pecho: Acuéstese boca arriba con kaylyn rodillas dobladas y kaylyn pies planos sobre el piso  [place] jeyson de las rodillas hacia high pecho y sosténgala por 5 segundos  Regrese high pierna a la posición inicial  Levante la otra rodilla hacia el pecho y sosténgala por 5 segundos  Pily esto 5 veces en cada lado  · Posición amanda camello: Coloque kaylyn mercedes y [employer]   Arquee high espalda hacia arriba, hacia el techo y [place] camelia  Arquee high tate dorsal lo más posible  Sostenga está posición por 5 segundos  Levante high camelia hacia arriba y baje high pecho hacia el piso  Sostenga está posición por 5 segundos  Pily 3 series o fredis se le indique  · Posición de cuclillas contra la pared: Párese con high espalda contra la pared  Contraiga los músculos de high abdomen  Lentamente deslice high cuerpo hasta que kaylyn rodillas queden dobladas en un ángulo de 45 grados  Mantenga esta posición por 5 segundos  Deslice lentamente high espalda hacia arriba hasta quedar de pie  Repita 10 veces  · Posición de acurrucarse: Acuéstese boca arriba con kaylny rodillas dobladas y kaylyn pies planos sobre el piso  Coloque kaylyn mercedes con las nella hacia abajo debajo de la curva de la parte baja de high espalda  Después, con kaylyn codos [place], levante kaylyn hombros y [place] 2 a 3 pulgadas  Mantenga high camelia a la misma altura de kaylyn hombros  Mantenga esta posición por 5 segundos  Cuando usted pueda hacer kalli ejercicio sin sentir dolor por 10 a 15 segundos, puede entonces añadir jeyson rotación  Mientras kaylyn hombros y high pecho estén levantados del piso, voltee levemente hacia la izquierda y [place]  Repita en el otro lado  · Ejercicio pájaro randy: Coloque kaylyn mercedes y rodillas sobre el piso  Mantenga kaylyn muñecas directamente debajo de kaylyn hombros y kaylyn rodillas directamente debajo de kaylyn caderas  Contraiga high ombligo hacia adentro en dirección a high columna  No estire ni arquee high espalda  Ponga tensos kaylyn músculos abdominales  Levante un brazo extendido para que se alinee con high camelia  Luego, levante la pierna opuesta a high brazo  Mantenga esta posición por 15 segundos  Baje high [place] y destin lentamente y [place] de taraho  Pily 5 series  © Copyright [facility] 2022 Information is for End User's use only and may not be sold, redistributed or otherwise used for commercial purposes   All illustrations and images included in CareNotes® are the copyrighted property of A D A M , Inc  or [address] [employer] información es sólo para uso en educación  High intención no es darle un consejo médico sobre enfermedades o tratamientos  Colsulte con high [place] farmacéutico antes de seguir cualquier régimen médico para saber si es seguro y efectivo para usted  Dolor sabine de la parte inferior de la espalda   LO QUE NECESITA SABER:   El dolor sabine de la región lumbar de la espalda es jeyson molestia repentina que dura hasta 6 semanas y que dificulta la [place]  INSTRUCCIONES SOBRE [facility]:   Regrese a la constanza de emergencias si:  · Usted tiene dolor intenso  · Usted repentinamente tiene rigidez o siente pesadez en ambos glúteos hacia abajo de ambas piernas  · Usted tiene entumecimiento o debilidad en jeyson pierna o dolor en ambas piernas  · Usted tiene entumecimiento en el área genital o en la región lumbar  · Usted no puede controlar high orina ni kaylyn deposiciones intestinales  Llame a high médico si:  · Tiene fiebre  · Usted tiene un dolor por la noche o cuando descansa  · High dolor no mejora con el tratamiento  · Usted tiene dolor que empeora cuando tose o estornuda  · Usted siente un estallido o chasquido repentino en high espalda  · Usted tiene preguntas o inquietudes acerca de high condición o cuidado  Medicamentos: Es posible que usted necesite alguno de los siguientes:  · Los [place], fredis el ibuprofeno, [facility] a disminuir la inflamación, el dolor y la [place]  Kalli medicamento está disponible con o sin jeyson receta médica  Los MARY pueden causar sangrado estomacal o problemas renales en ciertas personas  Si usted juan carlos un medicamento anticoagulante, siempre pregúntele a high médico si los MARY son seguros para usted  Siempre desmond la etiqueta de klali medicamento y [place] instrucciones  · Acetaminofén judith el dolor y baja la fiebre  Está disponible sin receta médica   Pregunte la cantidad y la frecuencia con que debe tomarlos  [address]  [place] las etiquetas de todos los demás medicamentos que esté usando para saber si también contienen acetaminofén, o pregunte a high médico o farmacéutico  El acetaminofén puede causar daño en el hígado cuando no se juan carlos de forma correcta  No use más de 4 gramos (4000 miligramos) en total de acetaminofeno en un día  · Relajantes musculares disminuyen el dolor y [employer] músculos de la parte inferior de la columna  · Puede administrarse podrían administrarse  Pregunte al médico cómo debe dawson kalli medicamento de forma childs  Algunos medicamentos recetados para el dolor contienen acetaminofén  No tome otros medicamentos que contengan acetaminofén sin consultarlo con high médico  Demasiado acetaminofeno puede causar daño al hígado  Los medicamentos recetados para el dolor podrían causar estreñimiento  Pregunte a high médico fredis prevenir o tratar estreñimiento  Cuidados personales:  · Manténgase activo lo más que pueda sin causar más dolor  El reposo en cama puede empeorar high dolor de espalda  Comience con ejercicios ligeros, fredis caminar  Evite levantar objetos hasta que ya no tenga dolor  Solicite más información acerca de las actividades físicas o plan de ejercicios que son los adecuados para usted  · Aplique calor en la espalda de 20 a 30 minutos cada 2 horas por los [employer] indiquen  El calor ayuda a disminuir el dolor y los espasmos musculares  [place] entre el calor y el hielo  · Aplique hielo en la espalda de 15 a 20 minutos cada hora o fredis se le indique  Use jeyson compresa de hielo o ponga hielo triturado en jeyson bolsa de plástico  Mike Sake con jeyson toalla antes de aplicarlo sobre high piel  El hielo ayuda a evitar daño al tejido y a disminuir la inflamación y el dolor  Prevenir el dolor sabine de la parte inferior de la espalda:  · Use la mecánica corporal adecuada  ? Flexione la cadera y las rodillas cuando Jeanie Mayo a levantar un objeto   No doble la cintura  Utilice los [employer] de las piernas mientras levanta high carga  No use high espalda  Mantenga el objeto cerca de high pecho mientras lo levanta  No se tuerza, ni levante cualquier cosa por encima de high cintura  ? [place] high posición frecuentemente cuando pase mucho tiempo de pie  Descanse un pie sobre jeyson [place] o un reposapiés e intercambie con el otro pie frecuentemente  ? No permanezca sentado por lapsos de tiempo prolongados  Cuando sea necesario hacerlo, siéntese en jeyson silla de respaldo recto con los pies apoyados en el suelo  Nunca alcance, jale ni empuje mientras se encuentra sentando  · Pily ejercicios que fortalezcan kaylyn músculos de la espalda  Entre en calor antes de hacer ejercicio  Consulte con high médico sobre [employer] plan de ejercicios para usted  · Mantenga un peso saludable  Pregúntele a high médico cuál es el peso ideal para usted  [place] que lo ayude a crear un plan para bajar de peso si tiene sobrepeso  Acuda a la consulta de control con high médico según las indicaciones: Regrese a jeyson conrad de seguimiento si usted aún tiene [employer] de 1 a 3 semanas de tratamiento  [place] necesite acudir con un ortopedista si high dolor de espalda dura más de 12 semanas  Anote kaylyn preguntas para que se acuerde de hacerlas bri kaylyn visitas  © Copyright [employer] 2022 Information is for End User's use only and may not be sold, redistributed or otherwise used for commercial purposes  All illustrations and images included in CareNotes® are the copyrighted property of A D A M , Inc  or [address] [place] [address] es sólo para uso en educación  High intención no es darle un consejo médico sobre enfermedades o tratamientos  Colsulte con high Lesleigh Daniel farmacéutico antes de seguir cualquier régimen médico para saber si es seguro y efectivo para usted

## 2022-03-16 NOTE — ASSESSMENT & PLAN NOTE
- Will obtain lumbar spine x-ray for further evaluation     - Will prescribe Robaxin 500 mg, to be taken twice daily as needed  - Will prescribe Voltaren gel, to be applied 4 times daily as needed to the affected area  - Advised to apply ice/ heating pad as needed to the affected area      - Provided patient with list of lower back exercises and stretches to perform daily     - If symptoms persist/ worsen, will consider referral to comprehensive spine program

## 2022-03-16 NOTE — PROGRESS NOTES
Assessment/Plan:  I have recommended that this patient have a mammogram but she declines at this time  I have discussed the risks and benefits of this examination with her  The patient verbalizes understanding  Patient notes she had completed a mammogram in 2020 in [facility] and does not wish to complete another 1 at this time  Essential hypertension  -  Systolic blood pressure is slightly elevated today, however patient notes at home her blood pressure readings have been ranging around 130/80  Patient does admit to being nervous today  - Will continue with current medication regimen-losartan -hydrochlorothiazide 100-25 mg daily   - Reviewed BP target goal with patient  - Advised to continue checking blood pressure a few times a week and to contact the office if blood pressure is consistently >140/90   - Continue to maintain healthy balanced diet with focus on low salt intake  Limit alcohol intake  - Advised to exercise at least 30 minutes a day for at least 5 days out of the week  Acute left-sided low back pain with left-sided sciatica  - Will obtain lumbar spine x-ray for further evaluation     - Will prescribe Robaxin 500 mg, to be taken twice daily as needed  - Will prescribe Voltaren gel, to be applied 4 times daily as needed to the affected area  - Advised to apply ice/ heating pad as needed to the affected area  - Provided patient with list of lower back exercises and stretches to perform daily     - If symptoms persist/ worsen, will consider referral to comprehensive spine program       Diagnoses and all orders for this visit:    Acute left-sided low back pain with left-sided sciatica  -     XR spine lumbar minimum 4 views non injury; Future  -     methocarbamol (ROBAXIN) 500 mg tablet; Take 1 tablet (500 mg total) by mouth 2 (two) times a day as needed for muscle spasms  -     Diclofenac Sodium (VOLTAREN) 1 %;  Apply 2 g topically 4 (four) times a day    Essential hypertension  -     CBC and differential; Future  -     Comprehensive metabolic panel; Future  -     Lipid panel; Future  -     CBC and differential  -     Comprehensive metabolic panel  -     Lipid panel  -     losartan-hydrochlorothiazide (HYZAAR) 100-25 MG per tablet; Take 1 tablet by mouth daily    Colon cancer screening  -     Alexandre    Class 1 obesity due to excess calories without serious comorbidity with body mass index (BMI) of 33 0 to 33 9 in adult    Depression screen          All of patients questions were answered  Patient understands and agrees with the above plan  Return in about 6 months (around 9/16/2022) for Next scheduled follow up HTN  Albin Donohue PA-C  03/16/22  [facility] ZONIA Anderson          Subjective:     Patient ID: Kevin Arciniega  is a 77 y o  female with known PHM of   Hypertension who presents today in office for  Hypertension follow-up  Patient is accompanied today by her son who provides help with [place] [facility] [place]  - Patient is a 77 y o  female who presents today for   Hypertension follow-up  Patient notes she has been taking her medication as prescribed  Patient notes occasionally she will check her blood pressure at home and it is usually around 130/80  Patient denies any headaches, dizziness, chest pain, palpitations, shortness of breath, lower leg swelling  Patient notes she generally follows a low-salt diet and is active around the house  - Patient notes for the past few months she has been experiencing left lower back pain  Patient denies any specific trauma or injury to the area, however patient notes she has fallen multiple times  Patient notes the pain is worse when moving or bending  Patient currently rates the pain as 8/10  Patient notes sometimes the pain will radiate down the left leg with associated tingling sensation down the left leg    Patient denies any fevers, chills, urinary symptoms, nausea, vomiting, diarrhea, constipation  She denies any saddle anesthesia, or loss of bladder or bowel function  Patient notes in the past she had imaging completed which showed herniated disc  Patient notes she has been taking Tylenol with some relief  The following portions of the patient's history were reviewed and updated as appropriate: allergies, current medications, past family history, past medical history, past social history, past surgical history and problem list         Review of Systems   Constitutional: Negative for chills, fatigue and fever  HENT: Negative for congestion, ear pain, rhinorrhea and sore throat  Eyes: Negative for pain and visual disturbance  Respiratory: Negative for cough, chest tightness and shortness of breath  Cardiovascular: Negative for chest pain, palpitations and leg swelling  Gastrointestinal: Negative for abdominal pain, constipation, diarrhea, nausea and vomiting  Genitourinary: Negative for difficulty urinating and dysuria  Musculoskeletal: Positive for back pain  Negative for arthralgias  Skin: Negative for rash  Neurological: Negative for dizziness and headaches  Psychiatric/Behavioral: The patient is not nervous/anxious  BMI Counseling: Body mass index is 33 69 kg/m²  The BMI is above normal  Nutrition recommendations include decreasing portion sizes, encouraging healthy choices of fruits and vegetables, consuming healthier snacks, limiting drinks that contain sugar, moderation in carbohydrate intake, increasing intake of lean protein and reducing intake of cholesterol  Exercise recommendations include moderate physical activity 150 minutes/week  No pharmacotherapy was ordered  Rationale for BMI follow-up plan is due to patient being overweight or obese  Depression Screening and Follow-up Plan: Patient was screened for depression during today's encounter  They screened negative with a PHQ-2 score of 0            Objective:   Vitals:    03/16/22 1006   BP: 160/80   BP Location: Right arm   Patient Position: Sitting   Cuff Size: Large   Pulse: 92   Resp: 16   Temp: 98 °F (36 7 °C)   TempSrc: Temporal   SpO2: 97%   Weight: 74 4 kg (164 lb)   Height: 4' 10 5" (1 486 m)         Physical Exam  Vitals and nursing note reviewed  Constitutional:       General: She is not in acute distress  Appearance: She is well-developed  HENT:      Head: Normocephalic and atraumatic  Right Ear: External ear normal       Left Ear: External ear normal       Nose: Nose normal    Eyes:      Conjunctiva/sclera: Conjunctivae normal    Cardiovascular:      Rate and Rhythm: Normal rate and regular rhythm  Pulses: Normal pulses  Heart sounds: Normal heart sounds  Pulmonary:      Effort: Pulmonary effort is normal  No respiratory distress  Breath sounds: Normal breath sounds  No wheezing  Abdominal:      General: Bowel sounds are normal       Palpations: Abdomen is soft  Tenderness: There is no abdominal tenderness  Musculoskeletal:      Cervical back: Normal range of motion and neck supple  Lumbar back: Tenderness (Left paralumbar area, pain worse with ROM) present  No swelling  Decreased range of motion (Secondary to pain)  Negative right straight leg raise test and negative left straight leg raise test    Skin:     General: Skin is warm and dry  Neurological:      Mental Status: She is alert and oriented to person, place, and time     Psychiatric:         Behavior: Behavior normal            PHQ-2/9 Depression Screening    Little interest or pleasure in doing things: 0 - not at all  Feeling down, depressed, or hopeless: 0 - not at all  PHQ-2 Score: 0  PHQ-2 Interpretation: Negative depression screen

## 2022-04-28 DIAGNOSIS — E78.2 MIXED HYPERLIPIDEMIA: Primary | ICD-10-CM

## 2022-04-28 RX ORDER — ATORVASTATIN CALCIUM 20 MG/1
20 TABLET, FILM COATED ORAL DAILY
Qty: 90 TABLET | Refills: 1 | Status: SHIPPED | OUTPATIENT
Start: 2022-04-28

## 2022-10-04 DIAGNOSIS — I10 ESSENTIAL HYPERTENSION: ICD-10-CM

## 2022-10-04 NOTE — TELEPHONE ENCOUNTER
Family member left a msg in the nurse line requesting refill on Losartan/htcz 100-25  Please advise

## 2022-10-05 RX ORDER — LOSARTAN POTASSIUM AND HYDROCHLOROTHIAZIDE 25; 100 MG/1; MG/1
1 TABLET ORAL DAILY
Qty: 90 TABLET | Refills: 0 | Status: SHIPPED | OUTPATIENT
Start: 2022-10-05

## 2022-10-05 NOTE — TELEPHONE ENCOUNTER
Will prescribe 90 day supply, but patient is due for hypertension follow up  Please schedule, thanks!

## 2022-10-06 NOTE — TELEPHONE ENCOUNTER
I have attempted to contact this patient by phone with the following results: left message to return my call on answering machine

## 2022-10-13 NOTE — TELEPHONE ENCOUNTER
I have attempted to contact this patient by phone with the following results: 2nd attempt, left message to return my call on answering machine

## 2022-10-17 NOTE — TELEPHONE ENCOUNTER
I have attempted to contact this patient by phone with the following results: 3rd attempt, left message to return my call on answering machine  Will send out a letter to contact the office to schedule appointment

## 2022-12-26 DIAGNOSIS — I10 ESSENTIAL HYPERTENSION: ICD-10-CM

## 2022-12-27 RX ORDER — LOSARTAN POTASSIUM AND HYDROCHLOROTHIAZIDE 25; 100 MG/1; MG/1
TABLET ORAL
Qty: 90 TABLET | Refills: 0 | Status: SHIPPED | OUTPATIENT
Start: 2022-12-27

## 2023-01-18 ENCOUNTER — APPOINTMENT (OUTPATIENT)
Dept: LAB | Facility: CLINIC | Age: 68
End: 2023-01-18

## 2023-01-18 ENCOUNTER — OFFICE VISIT (OUTPATIENT)
Dept: FAMILY MEDICINE CLINIC | Facility: CLINIC | Age: 68
End: 2023-01-18

## 2023-01-18 VITALS
WEIGHT: 168.6 LBS | BODY MASS INDEX: 33.99 KG/M2 | SYSTOLIC BLOOD PRESSURE: 142 MMHG | DIASTOLIC BLOOD PRESSURE: 84 MMHG | OXYGEN SATURATION: 97 % | RESPIRATION RATE: 14 BRPM | HEIGHT: 59 IN | HEART RATE: 90 BPM | TEMPERATURE: 97.1 F

## 2023-01-18 DIAGNOSIS — Z13.31 DEPRESSION SCREEN: ICD-10-CM

## 2023-01-18 DIAGNOSIS — E66.09 CLASS 1 OBESITY DUE TO EXCESS CALORIES WITHOUT SERIOUS COMORBIDITY WITH BODY MASS INDEX (BMI) OF 34.0 TO 34.9 IN ADULT: ICD-10-CM

## 2023-01-18 DIAGNOSIS — M54.50 CHRONIC BILATERAL LOW BACK PAIN WITHOUT SCIATICA: ICD-10-CM

## 2023-01-18 DIAGNOSIS — R05.1 ACUTE COUGH: ICD-10-CM

## 2023-01-18 DIAGNOSIS — E78.2 MIXED HYPERLIPIDEMIA: ICD-10-CM

## 2023-01-18 DIAGNOSIS — I10 ESSENTIAL HYPERTENSION: Primary | ICD-10-CM

## 2023-01-18 DIAGNOSIS — G89.29 CHRONIC BILATERAL LOW BACK PAIN WITHOUT SCIATICA: ICD-10-CM

## 2023-01-18 LAB
ALBUMIN SERPL BCP-MCNC: 4 G/DL (ref 3.5–5)
ALP SERPL-CCNC: 75 U/L (ref 46–116)
ALT SERPL W P-5'-P-CCNC: 25 U/L (ref 12–78)
ANION GAP SERPL CALCULATED.3IONS-SCNC: 4 MMOL/L (ref 4–13)
AST SERPL W P-5'-P-CCNC: 17 U/L (ref 5–45)
BASOPHILS # BLD AUTO: 0.04 THOUSANDS/ÂΜL (ref 0–0.1)
BASOPHILS NFR BLD AUTO: 1 % (ref 0–1)
BILIRUB SERPL-MCNC: 0.57 MG/DL (ref 0.2–1)
BUN SERPL-MCNC: 14 MG/DL (ref 5–25)
CALCIUM SERPL-MCNC: 9.7 MG/DL (ref 8.3–10.1)
CHLORIDE SERPL-SCNC: 104 MMOL/L (ref 96–108)
CHOLEST SERPL-MCNC: 237 MG/DL
CO2 SERPL-SCNC: 30 MMOL/L (ref 21–32)
CREAT SERPL-MCNC: 0.71 MG/DL (ref 0.6–1.3)
EOSINOPHIL # BLD AUTO: 0.15 THOUSAND/ÂΜL (ref 0–0.61)
EOSINOPHIL NFR BLD AUTO: 4 % (ref 0–6)
ERYTHROCYTE [DISTWIDTH] IN BLOOD BY AUTOMATED COUNT: 13.4 % (ref 11.6–15.1)
GFR SERPL CREATININE-BSD FRML MDRD: 88 ML/MIN/1.73SQ M
GLUCOSE P FAST SERPL-MCNC: 104 MG/DL (ref 65–99)
HCT VFR BLD AUTO: 39.7 % (ref 34.8–46.1)
HDLC SERPL-MCNC: 75 MG/DL
HGB BLD-MCNC: 12.8 G/DL (ref 11.5–15.4)
IMM GRANULOCYTES # BLD AUTO: 0.01 THOUSAND/UL (ref 0–0.2)
IMM GRANULOCYTES NFR BLD AUTO: 0 % (ref 0–2)
LDLC SERPL CALC-MCNC: 127 MG/DL (ref 0–100)
LYMPHOCYTES # BLD AUTO: 1.51 THOUSANDS/ÂΜL (ref 0.6–4.47)
LYMPHOCYTES NFR BLD AUTO: 43 % (ref 14–44)
MCH RBC QN AUTO: 28.4 PG (ref 26.8–34.3)
MCHC RBC AUTO-ENTMCNC: 32.2 G/DL (ref 31.4–37.4)
MCV RBC AUTO: 88 FL (ref 82–98)
MONOCYTES # BLD AUTO: 0.41 THOUSAND/ÂΜL (ref 0.17–1.22)
MONOCYTES NFR BLD AUTO: 12 % (ref 4–12)
NEUTROPHILS # BLD AUTO: 1.43 THOUSANDS/ÂΜL (ref 1.85–7.62)
NEUTS SEG NFR BLD AUTO: 40 % (ref 43–75)
NRBC BLD AUTO-RTO: 0 /100 WBCS
PLATELET # BLD AUTO: 316 THOUSANDS/UL (ref 149–390)
PMV BLD AUTO: 10.3 FL (ref 8.9–12.7)
POTASSIUM SERPL-SCNC: 3.6 MMOL/L (ref 3.5–5.3)
PROT SERPL-MCNC: 8.4 G/DL (ref 6.4–8.4)
RBC # BLD AUTO: 4.5 MILLION/UL (ref 3.81–5.12)
SODIUM SERPL-SCNC: 138 MMOL/L (ref 135–147)
TRIGL SERPL-MCNC: 176 MG/DL
WBC # BLD AUTO: 3.55 THOUSAND/UL (ref 4.31–10.16)

## 2023-01-18 RX ORDER — METHOCARBAMOL 500 MG/1
500 TABLET, FILM COATED ORAL 2 TIMES DAILY PRN
Qty: 60 TABLET | Refills: 1 | Status: SHIPPED | OUTPATIENT
Start: 2023-01-18

## 2023-01-18 RX ORDER — ATORVASTATIN CALCIUM 20 MG/1
20 TABLET, FILM COATED ORAL DAILY
Qty: 90 TABLET | Refills: 1 | Status: SHIPPED | OUTPATIENT
Start: 2023-01-18

## 2023-01-18 RX ORDER — LOSARTAN POTASSIUM AND HYDROCHLOROTHIAZIDE 25; 100 MG/1; MG/1
1 TABLET ORAL DAILY
Qty: 90 TABLET | Refills: 3 | Status: SHIPPED | OUTPATIENT
Start: 2023-01-18

## 2023-01-18 NOTE — ASSESSMENT & PLAN NOTE
- Continue losartan-hydrochlorothiazide 100-25 mg once daily  - Currently blood pressure is controlled at this time  Reviewed BP target goal with patient  - Continue to maintain healthy balanced diet with focus on low salt intake  Limit alcohol intake  - Advised to exercise at least 30 minutes a day for at least 5 days out of the week

## 2023-01-18 NOTE — ASSESSMENT & PLAN NOTE
- Stable  Continue Robaxin 500 mg, twice daily as needed  - Continue Voltaren gel as needed  - Continue to apply ice/heating pad/topical therapies as needed to affected area  - Continue daily exercises and stretches

## 2023-01-18 NOTE — PROGRESS NOTES
Assessment/Plan:  - I have recommended that this patient have a flu shot and immunization for pneumonia but she declines at this time  I have discussed the risks and benefits of this examination with her  The patient verbalizes understanding     -Per patient, she completed DEXA scan, Pap smear, and mammogram in summer 2022 and [facility]  Advised to bring in a copy of the test to scanned into chart   -Recommended patient to complete Cologuard at her earliest convenience  Mixed hyperlipidemia  - Patient has been taking atorvastatin about 2-3 times a week  - We will check updated lipid panel  Essential hypertension  - Continue losartan-hydrochlorothiazide 100-25 mg once daily  - Currently blood pressure is controlled at this time  Reviewed BP target goal with patient  - Continue to maintain healthy balanced diet with focus on low salt intake  Limit alcohol intake  - Advised to exercise at least 30 minutes a day for at least 5 days out of the week  Acute left-sided low back pain with left-sided sciatica  - Stable  Continue Robaxin 500 mg, twice daily as needed  - Continue Voltaren gel as needed  - Continue to apply ice/heating pad/topical therapies as needed to affected area  - Continue daily exercises and stretches  Diagnoses and all orders for this visit:    Essential hypertension  -     losartan-hydrochlorothiazide (HYZAAR) 100-25 MG per tablet; Take 1 tablet by mouth daily    Chronic bilateral low back pain without sciatica  -     methocarbamol (ROBAXIN) 500 mg tablet; Take 1 tablet (500 mg total) by mouth 2 (two) times a day as needed for muscle spasms  -     Diclofenac Sodium (VOLTAREN) 1 %; Apply 2 g topically 4 (four) times a day    Mixed hyperlipidemia  -     atorvastatin (LIPITOR) 20 mg tablet; Take 1 tablet (20 mg total) by mouth daily  -     CBC and differential; Future  -     Comprehensive metabolic panel;  Future  -     Lipid Panel with Direct LDL reflex; Future    Acute cough  -     dextromethorphan-guaifenesin (MUCINEX DM)  MG per 12 hr tablet; Take 1 tablet by mouth every 12 (twelve) hours    Depression screen    Class 1 obesity due to excess calories without serious comorbidity with body mass index (BMI) of 34 0 to 34 9 in adult        All of patients questions were answered  Patient understands and agrees with the above plan  Return in about 6 months (around 7/18/2023) for Next scheduled follow up HTN  Dixie Sharp PA-C  01/18/23  [address] [place]          Subjective:     Patient ID: Rebecca Pantoja  is a 79 y o  female with known PMH of hypertension, hyperlipidemia, back pain who presents today in office for hypertension follow up  Patient is accompanied today by her son who provides translation from [facility] [place] to [place]  - Patient is a 79 y o  female who presents today for hypertension follow up  Overall, patient notes she has been doing well  Patient notes she continues with the back pain, but it has been stable  She denies any fevers, numbness or tingling down the legs, saddle anesthesia, or loss of bladder or bowel function  Patient notes she has been taking her blood pressure medication every day, but takes her cholesterol medication about 2-3 times a week  Patient notes she has been experiencing nasal congestion and cough for about 1 month  Patient denies any fevers, chills, shortness of breath, headaches, dizziness, sore throat, ear pain, nausea, vomiting  The following portions of the patient's history were reviewed and updated as appropriate: allergies, current medications, past family history, past medical history, past social history, past surgical history and problem list         Review of Systems   Constitutional: Negative for chills, fatigue and fever  HENT: Positive for congestion  Negative for ear pain, rhinorrhea and sore throat  Eyes: Negative for pain and visual disturbance     Respiratory: Positive for cough  Negative for chest tightness and shortness of breath  Cardiovascular: Negative for chest pain, palpitations and leg swelling  Gastrointestinal: Negative for abdominal pain, constipation, diarrhea, nausea and vomiting  Genitourinary: Negative for difficulty urinating and dysuria  Musculoskeletal: Positive for back pain (Stable)  Negative for arthralgias  Skin: Negative for rash  Neurological: Negative for dizziness and headaches  Psychiatric/Behavioral: The patient is not nervous/anxious  BMI Counseling: Body mass index is 34 64 kg/m²  The BMI is above normal  Nutrition recommendations include decreasing portion sizes, encouraging healthy choices of fruits and vegetables, consuming healthier snacks, limiting drinks that contain sugar, moderation in carbohydrate intake, increasing intake of lean protein and reducing intake of cholesterol  Exercise recommendations include exercising 3-5 times per week  No pharmacotherapy was ordered  Rationale for BMI follow-up plan is due to patient being overweight or obese  Depression Screening and Follow-up Plan: Patient was screened for depression during today's encounter  They screened negative with a PHQ-2 score of 0  Objective:   Vitals:    01/18/23 1023   BP: 142/84   BP Location: Left arm   Patient Position: Sitting   Cuff Size: Adult   Pulse: 90   Resp: 14   Temp: (!) 97 1 °F (36 2 °C)   TempSrc: Temporal   SpO2: 97%   Weight: 76 5 kg (168 lb 9 6 oz)   Height: 4' 10 5" (1 486 m)         Physical Exam  Vitals and nursing note reviewed  Constitutional:       General: She is not in acute distress  Appearance: She is well-developed  HENT:      Head: Normocephalic and atraumatic  Right Ear: External ear normal       Left Ear: External ear normal       Nose: Nose normal    Eyes:      Conjunctiva/sclera: Conjunctivae normal    Cardiovascular:      Rate and Rhythm: Normal rate and regular rhythm  Pulses: Normal pulses  Heart sounds: Normal heart sounds  Pulmonary:      Effort: Pulmonary effort is normal  No respiratory distress  Breath sounds: Normal breath sounds  No wheezing  Musculoskeletal:      Cervical back: Normal range of motion and neck supple  Right lower leg: No edema  Left lower leg: No edema  Skin:     General: Skin is warm and dry  Neurological:      Mental Status: She is alert and oriented to person, place, and time     Psychiatric:         Behavior: Behavior normal            PHQ-2/9 Depression Screening    Little interest or pleasure in doing things: 0 - not at all  Feeling down, depressed, or hopeless: 0 - not at all  PHQ-2 Score: 0  PHQ-2 Interpretation: Negative depression screen

## 2023-08-07 ENCOUNTER — OFFICE VISIT (OUTPATIENT)
Dept: FAMILY MEDICINE CLINIC | Facility: CLINIC | Age: 68
End: 2023-08-07

## 2023-08-07 VITALS
TEMPERATURE: 98.7 F | BODY MASS INDEX: 33.67 KG/M2 | WEIGHT: 167 LBS | DIASTOLIC BLOOD PRESSURE: 80 MMHG | HEIGHT: 59 IN | SYSTOLIC BLOOD PRESSURE: 140 MMHG | OXYGEN SATURATION: 98 % | HEART RATE: 85 BPM | RESPIRATION RATE: 16 BRPM

## 2023-08-07 DIAGNOSIS — L03.011 CELLULITIS OF FINGER OF RIGHT HAND: Primary | ICD-10-CM

## 2023-08-07 PROCEDURE — 3079F DIAST BP 80-89 MM HG: CPT | Performed by: FAMILY MEDICINE

## 2023-08-07 PROCEDURE — 3077F SYST BP >= 140 MM HG: CPT | Performed by: FAMILY MEDICINE

## 2023-08-07 PROCEDURE — 99214 OFFICE O/P EST MOD 30 MIN: CPT | Performed by: FAMILY MEDICINE

## 2023-08-07 RX ORDER — TRIAMCINOLONE ACETONIDE 1 MG/G
CREAM TOPICAL 2 TIMES DAILY
Qty: 30 G | Refills: 0 | Status: SHIPPED | OUTPATIENT
Start: 2023-08-07

## 2023-08-07 RX ORDER — DOXYCYCLINE HYCLATE 100 MG/1
100 CAPSULE ORAL EVERY 12 HOURS SCHEDULED
Qty: 10 CAPSULE | Refills: 0 | Status: SHIPPED | OUTPATIENT
Start: 2023-08-07 | End: 2023-08-07 | Stop reason: CLARIF

## 2023-08-07 RX ORDER — LORATADINE 10 MG/1
10 TABLET ORAL DAILY
Qty: 30 TABLET | Refills: 2 | Status: SHIPPED | OUTPATIENT
Start: 2023-08-07

## 2023-08-07 RX ORDER — SULFAMETHOXAZOLE AND TRIMETHOPRIM 800; 160 MG/1; MG/1
1 TABLET ORAL 2 TIMES DAILY
Qty: 14 TABLET | Refills: 0 | Status: SHIPPED | OUTPATIENT
Start: 2023-08-07 | End: 2023-08-14

## 2023-08-07 NOTE — PROGRESS NOTES
Name: Benito Thompson      : 1955      MRN: [id number]  Encounter Provider: Roby Christian MD  Encounter Date: 2023   Encounter department: [address] [facility]     1. Cellulitis of finger of right hand  Assessment & Plan:  -of pinky finger, may be related to a bug bite however patient unable to recall any particular bite    PLAN  -TMP--160 BID for 7 days for MRSA coverage and have probiotic yogurt for lunch to prevent yeast infection, triamcinolone 0.1% BID for up to two weeks at a time with education and precautions, claritin 10mg PRN  -f/u in 3 days  -if increased edema, pain, development of fevers, return sooner    Orders:  -     triamcinolone (KENALOG) 0.1 % cream; Apply topically 2 (two) times a day  -     loratadine (CLARITIN) 10 mg tablet; Take 1 tablet (10 mg total) by mouth daily  -     sulfamethoxazole-trimethoprim (BACTRIM DS) 800-160 mg per tablet; Take 1 tablet by mouth 2 (two) times a day for 7 days         Subjective      Presents with son in law who functions as additional historian and interpretor  78 yo female patient presents with edematous and erythematous right pinky finger that began Saturday night with itching which progressed to swelling. Yesterday pain was 10/10 and extremely uncomfortable. On Saturday spent time outside at a gathering but can't recall any particular bug bite. Reports tingling sensation in finger. Took diclofenac tablet that she had at home at 2 am this morning containing 100mg diclofenac and vitamins B1, B6, B12, and B2 with some relief. Patient denies traumatic event. She does have a hx of arthritis. Additionally, no personal or family history of gout. No fevers. No changes in urine. Patient works as home health aide. Review of Systems   Constitutional: Negative for chills and fever. HENT: Negative for ear pain and sore throat. Eyes: Negative for pain and visual disturbance. Respiratory: Negative for cough and shortness of breath. Cardiovascular: Negative for chest pain and palpitations. Gastrointestinal: Negative for abdominal pain and vomiting. Genitourinary: Negative for dysuria and hematuria. Musculoskeletal: Positive for joint swelling. Negative for arthralgias and back pain. Skin: Negative for color change and rash. Neurological: Negative for seizures and syncope. All other systems reviewed and are negative. Current Outpatient Medications on File Prior to Visit   Medication Sig   • aspirin (ECOTRIN LOW STRENGTH) 81 mg EC tablet Take 1 tablet (81 mg total) by mouth every 24 hours   • atorvastatin (LIPITOR) 20 mg tablet Take 1 tablet (20 mg total) by mouth daily   • Cholecalciferol (VITAMIN D) 50 MCG (2000 UT) tablet Take 1 tablet (2,000 Units total) by mouth daily   • dextromethorphan-guaifenesin (MUCINEX DM)  MG per 12 hr tablet Take 1 tablet by mouth every 12 (twelve) hours   • Diclofenac Sodium (VOLTAREN) 1 % Apply 2 g topically 4 (four) times a day   • losartan-hydrochlorothiazide (HYZAAR) 100-25 MG per tablet Take 1 tablet by mouth daily   • methocarbamol (ROBAXIN) 500 mg tablet Take 1 tablet (500 mg total) by mouth 2 (two) times a day as needed for muscle spasms       Objective     /80 (BP Location: Right arm, Patient Position: Sitting, Cuff Size: Large)   Pulse 85   Temp 98.7 °F (37.1 °C) (Temporal)   Resp 16   Ht 4' 10.5" (1.486 m)   Wt 75.8 kg (167 lb)   SpO2 98%   BMI 34.31 kg/m²     Physical Exam  Constitutional:       Appearance: Normal appearance. She is normal weight. HENT:      Head: Normocephalic and atraumatic. Right Ear: External ear normal.      Left Ear: External ear normal.      Nose: Nose normal.      Mouth/Throat:      Mouth: Mucous membranes are moist.      Pharynx: Oropharynx is clear. Eyes:      General: No scleral icterus. Right eye: No discharge. Left eye: No discharge.       Extraocular Movements: Extraocular movements intact. Conjunctiva/sclera: Conjunctivae normal.   Musculoskeletal:         General: Swelling (edematous right pinky; please see image) present. Normal range of motion. Cervical back: Normal range of motion. Skin:     General: Skin is warm. Capillary Refill: Capillary refill takes less than 2 seconds. Capillary refill <2 of right pinky nail bed     Findings: Erythema (of entire pinky finger distal to MCP joint) present. No rash. Neurological:      General: No focal deficit present. Mental Status: She is alert. Psychiatric:         Mood and Affect: Mood normal.         Behavior: Behavior normal.         Thought Content:  Thought content normal.         Judgment: Judgment normal.                     Charanjit Finch MD

## 2023-08-07 NOTE — ASSESSMENT & PLAN NOTE
-of pinky finger, may be related to a bug bite however patient unable to recall any particular bite    PLAN  -TMP--160 BID for 7 days for MRSA coverage and have probiotic yogurt for lunch to prevent yeast infection, triamcinolone 0.1% BID for up to two weeks at a time with education and precautions, claritin 10mg PRN  -f/u in 3 days  -if increased edema, pain, development of fevers, return sooner

## 2023-08-29 ENCOUNTER — OFFICE VISIT (OUTPATIENT)
Dept: FAMILY MEDICINE CLINIC | Facility: CLINIC | Age: 68
End: 2023-08-29

## 2023-08-29 ENCOUNTER — APPOINTMENT (OUTPATIENT)
Dept: LAB | Facility: CLINIC | Age: 68
End: 2023-08-29
Payer: COMMERCIAL

## 2023-08-29 VITALS
HEIGHT: 59 IN | OXYGEN SATURATION: 99 % | HEART RATE: 92 BPM | WEIGHT: 167 LBS | DIASTOLIC BLOOD PRESSURE: 80 MMHG | SYSTOLIC BLOOD PRESSURE: 120 MMHG | RESPIRATION RATE: 16 BRPM | BODY MASS INDEX: 33.67 KG/M2 | TEMPERATURE: 98.7 F

## 2023-08-29 DIAGNOSIS — Z00.00 HEALTHCARE MAINTENANCE: ICD-10-CM

## 2023-08-29 DIAGNOSIS — M54.42 ACUTE LEFT-SIDED LOW BACK PAIN WITH LEFT-SIDED SCIATICA: ICD-10-CM

## 2023-08-29 DIAGNOSIS — Z00.00 ANNUAL PHYSICAL EXAM: Primary | ICD-10-CM

## 2023-08-29 DIAGNOSIS — Z12.11 COLON CANCER SCREENING: ICD-10-CM

## 2023-08-29 DIAGNOSIS — I10 ESSENTIAL HYPERTENSION: ICD-10-CM

## 2023-08-29 DIAGNOSIS — M54.50 CHRONIC BILATERAL LOW BACK PAIN WITHOUT SCIATICA: ICD-10-CM

## 2023-08-29 DIAGNOSIS — E78.2 MIXED HYPERLIPIDEMIA: ICD-10-CM

## 2023-08-29 DIAGNOSIS — G89.29 CHRONIC BILATERAL LOW BACK PAIN WITHOUT SCIATICA: ICD-10-CM

## 2023-08-29 DIAGNOSIS — R20.2 NUMBNESS AND TINGLING OF BOTH FEET: ICD-10-CM

## 2023-08-29 DIAGNOSIS — R20.0 NUMBNESS AND TINGLING OF BOTH FEET: ICD-10-CM

## 2023-08-29 LAB
25(OH)D3 SERPL-MCNC: 26.5 NG/ML (ref 30–100)
ALBUMIN SERPL BCP-MCNC: 4.5 G/DL (ref 3.5–5)
ALP SERPL-CCNC: 64 U/L (ref 34–104)
ALT SERPL W P-5'-P-CCNC: 17 U/L (ref 7–52)
ANION GAP SERPL CALCULATED.3IONS-SCNC: 10 MMOL/L
AST SERPL W P-5'-P-CCNC: 20 U/L (ref 13–39)
BASOPHILS # BLD AUTO: 0.03 THOUSANDS/ÂΜL (ref 0–0.1)
BASOPHILS NFR BLD AUTO: 1 % (ref 0–1)
BILIRUB SERPL-MCNC: 0.68 MG/DL (ref 0.2–1)
BUN SERPL-MCNC: 17 MG/DL (ref 5–25)
CALCIUM SERPL-MCNC: 10 MG/DL (ref 8.4–10.2)
CHLORIDE SERPL-SCNC: 98 MMOL/L (ref 96–108)
CHOLEST SERPL-MCNC: 216 MG/DL
CO2 SERPL-SCNC: 29 MMOL/L (ref 21–32)
CREAT SERPL-MCNC: 0.74 MG/DL (ref 0.6–1.3)
EOSINOPHIL # BLD AUTO: 0.2 THOUSAND/ÂΜL (ref 0–0.61)
EOSINOPHIL NFR BLD AUTO: 6 % (ref 0–6)
ERYTHROCYTE [DISTWIDTH] IN BLOOD BY AUTOMATED COUNT: 13.3 % (ref 11.6–15.1)
EST. AVERAGE GLUCOSE BLD GHB EST-MCNC: 134 MG/DL
GFR SERPL CREATININE-BSD FRML MDRD: 83 ML/MIN/1.73SQ M
GLUCOSE P FAST SERPL-MCNC: 97 MG/DL (ref 65–99)
HBA1C MFR BLD: 6.3 %
HCT VFR BLD AUTO: 41.3 % (ref 34.8–46.1)
HDLC SERPL-MCNC: 74 MG/DL
HGB BLD-MCNC: 13.4 G/DL (ref 11.5–15.4)
IMM GRANULOCYTES # BLD AUTO: 0.01 THOUSAND/UL (ref 0–0.2)
IMM GRANULOCYTES NFR BLD AUTO: 0 % (ref 0–2)
LDLC SERPL CALC-MCNC: 109 MG/DL (ref 0–100)
LYMPHOCYTES # BLD AUTO: 1.5 THOUSANDS/ÂΜL (ref 0.6–4.47)
LYMPHOCYTES NFR BLD AUTO: 46 % (ref 14–44)
MCH RBC QN AUTO: 28.3 PG (ref 26.8–34.3)
MCHC RBC AUTO-ENTMCNC: 32.4 G/DL (ref 31.4–37.4)
MCV RBC AUTO: 87 FL (ref 82–98)
MONOCYTES # BLD AUTO: 0.37 THOUSAND/ÂΜL (ref 0.17–1.22)
MONOCYTES NFR BLD AUTO: 12 % (ref 4–12)
NEUTROPHILS # BLD AUTO: 1.12 THOUSANDS/ÂΜL (ref 1.85–7.62)
NEUTS SEG NFR BLD AUTO: 35 % (ref 43–75)
NONHDLC SERPL-MCNC: 142 MG/DL
NRBC BLD AUTO-RTO: 0 /100 WBCS
PLATELET # BLD AUTO: 307 THOUSANDS/UL (ref 149–390)
PMV BLD AUTO: 10.9 FL (ref 8.9–12.7)
POTASSIUM SERPL-SCNC: 3.9 MMOL/L (ref 3.5–5.3)
PROT SERPL-MCNC: 7.9 G/DL (ref 6.4–8.4)
RBC # BLD AUTO: 4.74 MILLION/UL (ref 3.81–5.12)
SODIUM SERPL-SCNC: 137 MMOL/L (ref 135–147)
TRIGL SERPL-MCNC: 166 MG/DL
TSH SERPL DL<=0.05 MIU/L-ACNC: 1.95 UIU/ML (ref 0.45–4.5)
VIT B12 SERPL-MCNC: 527 PG/ML (ref 180–914)
WBC # BLD AUTO: 3.23 THOUSAND/UL (ref 4.31–10.16)

## 2023-08-29 PROCEDURE — 84443 ASSAY THYROID STIM HORMONE: CPT

## 2023-08-29 PROCEDURE — 80053 COMPREHEN METABOLIC PANEL: CPT

## 2023-08-29 PROCEDURE — 99214 OFFICE O/P EST MOD 30 MIN: CPT | Performed by: PHYSICIAN ASSISTANT

## 2023-08-29 PROCEDURE — 82607 VITAMIN B-12: CPT

## 2023-08-29 PROCEDURE — 36415 COLL VENOUS BLD VENIPUNCTURE: CPT

## 2023-08-29 PROCEDURE — 3079F DIAST BP 80-89 MM HG: CPT | Performed by: PHYSICIAN ASSISTANT

## 2023-08-29 PROCEDURE — 3074F SYST BP LT 130 MM HG: CPT | Performed by: PHYSICIAN ASSISTANT

## 2023-08-29 PROCEDURE — 82306 VITAMIN D 25 HYDROXY: CPT

## 2023-08-29 PROCEDURE — 99397 PER PM REEVAL EST PAT 65+ YR: CPT | Performed by: PHYSICIAN ASSISTANT

## 2023-08-29 PROCEDURE — 83036 HEMOGLOBIN GLYCOSYLATED A1C: CPT

## 2023-08-29 PROCEDURE — 85025 COMPLETE CBC W/AUTO DIFF WBC: CPT

## 2023-08-29 PROCEDURE — 80061 LIPID PANEL: CPT

## 2023-08-29 RX ORDER — ATORVASTATIN CALCIUM 20 MG/1
20 TABLET, FILM COATED ORAL DAILY
Qty: 90 TABLET | Refills: 1 | Status: SHIPPED | OUTPATIENT
Start: 2023-08-29

## 2023-08-29 RX ORDER — METHOCARBAMOL 500 MG/1
500 TABLET, FILM COATED ORAL 2 TIMES DAILY PRN
Qty: 60 TABLET | Refills: 1 | Status: SHIPPED | OUTPATIENT
Start: 2023-08-29

## 2023-08-29 NOTE — ASSESSMENT & PLAN NOTE
- Stable. Continue Robaxin 500 mg, twice daily as needed. - Continue Voltaren gel as needed. - Continue to apply ice/heating pad/topical therapies as needed to affected area. - Continue daily exercises and stretches.

## 2023-08-29 NOTE — ASSESSMENT & PLAN NOTE
BP Readings from Last 3 Encounters:   08/29/23 120/80   08/07/23 140/80   01/18/23 142/84   BP at goal   Continue Losartan - Hydrochlorothiazide daily  Continue with low salt diet and daily physical activity.

## 2023-08-29 NOTE — PROGRESS NOTES
[facility]    NAME: Marbin Quick  AGE: 76 y.o. SEX: female  : 1955     DATE: 2023     Assessment and Plan:     Problem List Items Addressed This Visit        Cardiovascular and Mediastinum    Essential hypertension     BP Readings from Last 3 Encounters:   23 120/80   23 140/80   23 142/84   BP at goal   Continue Losartan - Hydrochlorothiazide daily  Continue with low salt diet and daily physical activity. Nervous and Auditory    Acute left-sided low back pain with left-sided sciatica     - Stable. Continue Robaxin 500 mg, twice daily as needed. - Continue Voltaren gel as needed. - Continue to apply ice/heating pad/topical therapies as needed to affected area. - Continue daily exercises and stretches. Relevant Medications    Diclofenac Sodium (VOLTAREN) 1 %    methocarbamol (ROBAXIN) 500 mg tablet       Other    Mixed hyperlipidemia     - Patient has been taking atorvastatin about 2-3 times a week. - We will check updated lipid panel. Relevant Medications    atorvastatin (LIPITOR) 20 mg tablet    Numbness and tingling of both feet     - Will check HgBA1c, vitamin D, vitamin B 12.          Other Visit Diagnoses     Annual physical exam    -  Primary    Healthcare maintenance        Relevant Orders    CBC and differential    Comprehensive metabolic panel    Lipid panel    TSH, 3rd generation with Free T4 reflex    Hemoglobin A1C    Vitamin D 25 hydroxy    Vitamin B12    Colon cancer screening        Relevant Orders    Cologuard    Chronic bilateral low back pain without sciatica        Relevant Medications    Diclofenac Sodium (VOLTAREN) 1 %    methocarbamol (ROBAXIN) 500 mg tablet        - Patient notes she had DEXA, PAP, mammogram completed in [facility] and everything was normal. Recommend patient bring copy of records to next appointment to reconcile patient's chart. Immunizations and preventive care screenings were discussed with patient today. Appropriate education was printed on patient's after visit summary. Counseling:  Alcohol/drug use: discussed moderation in alcohol intake, the recommendations for healthy alcohol use, and avoidance of illicit drug use. Dental Health: discussed importance of regular tooth brushing, flossing, and dental visits. Injury prevention: discussed safety/seat belts, safety helmets, smoke detectors, carbon dioxide detectors, and smoking near bedding or upholstery. Sexual health: discussed sexually transmitted diseases, partner selection, use of condoms, avoidance of unintended pregnancy, and contraceptive alternatives. Exercise: the importance of regular exercise/physical activity was discussed. Recommend exercise 3-5 times per week for at least 30 minutes. Return in about 6 months (around 2/29/2024) for Next scheduled follow up HTN. Chief Complaint:     Chief Complaint   Patient presents with   • Follow-up      History of Present Illness:     Adult Annual Physical   Patient here for a comprehensive physical exam. The patient reports problems - Foot numbness & tingling. Diet and Physical Activity  Diet/Nutrition: well balanced diet. Exercise: walking. Depression Screening  PHQ-2/9 Depression Screening    Little interest or pleasure in doing things: 0 - not at all  Feeling down, depressed, or hopeless: 0 - not at all  PHQ-2 Score: 0  PHQ-2 Interpretation: Negative depression screen       General Health  Sleep: sleeps well. Hearing: normal - bilateral.  Vision: no vision problems and wears glasses. Dental: regular dental visits, no dental visits for >1 year and brushes teeth twice daily. /GYN Health  Patient is: postmenopausal  Contraceptive method: None. Review of Systems:     Review of Systems   Constitutional: Negative for appetite change, chills and fever.    HENT: Negative for congestion, ear pain and sore throat. Eyes: Negative for pain and visual disturbance. Respiratory: Negative for cough, chest tightness and shortness of breath. Cardiovascular: Negative for chest pain and palpitations. Gastrointestinal: Negative for abdominal pain, constipation, diarrhea, nausea and vomiting. Endocrine: Positive for polyuria. Genitourinary: Negative for difficulty urinating, dysuria and hematuria. Musculoskeletal: Positive for arthralgias. Negative for myalgias. Skin: Negative for color change and rash. Neurological: Positive for headaches. Negative for dizziness and light-headedness. Psychiatric/Behavioral: Negative for confusion. Past Medical History:     Past Medical History:   Diagnosis Date   • Hyperlipidemia    • Hypertension       Past Surgical History:     Past Surgical History:   Procedure Laterality Date   •  SECTION        Social History:     Social History     Socioeconomic History   • Marital status: Single     Spouse name: None   • Number of children: None   • Years of education: None   • Highest education level: None   Occupational History   • None   Tobacco Use   • Smoking status: Never   • Smokeless tobacco: Never   Substance and Sexual Activity   • Alcohol use: Yes     Comment: social   • Drug use: No   • Sexual activity: None   Other Topics Concern   • None   Social History Narrative   • None     Social Determinants of Health     Financial Resource Strain: Low Risk  (3/16/2022)    Overall Financial Resource Strain (CARDIA)    • Difficulty of Paying Living Expenses: Not hard at all   Food Insecurity: No Food Insecurity (3/16/2022)    Hunger Vital Sign    • Worried About Running Out of Food in the Last Year: Never true    • Ran Out of Food in the Last Year: Never true   Transportation Needs: No Transportation Needs (3/16/2022)    PRAPARE - Transportation    • Lack of Transportation (Medical):  No    • Lack of Transportation (Non-Medical): No   Physical Activity: Not on file   Stress: Not on file   Social Connections: Not on file   Intimate Partner Violence: Not on file   Housing Stability: Not on file      Family History:     Family History   Problem Relation Age of Onset   • Asthma Mother    • Hypertension Mother    • COPD Father       Current Medications:     Current Outpatient Medications   Medication Sig Dispense Refill   • aspirin (ECOTRIN LOW STRENGTH) 81 mg EC tablet Take 1 tablet (81 mg total) by mouth every 24 hours 90 tablet 3   • atorvastatin (LIPITOR) 20 mg tablet Take 1 tablet (20 mg total) by mouth daily 90 tablet 1   • Diclofenac Sodium (VOLTAREN) 1 % Apply 2 g topically 4 (four) times a day 150 g 1   • loratadine (CLARITIN) 10 mg tablet Take 1 tablet (10 mg total) by mouth daily 30 tablet 2   • losartan-hydrochlorothiazide (HYZAAR) 100-25 MG per tablet Take 1 tablet by mouth daily 90 tablet 3   • methocarbamol (ROBAXIN) 500 mg tablet Take 1 tablet (500 mg total) by mouth 2 (two) times a day as needed for muscle spasms 60 tablet 1   • triamcinolone (KENALOG) 0.1 % cream Apply topically 2 (two) times a day 30 g 0   • Cholecalciferol (VITAMIN D) 50 MCG (2000 UT) tablet Take 1 tablet (2,000 Units total) by mouth daily (Patient not taking: Reported on 8/29/2023) 90 tablet 1     No current facility-administered medications for this visit. Allergies: Allergies   Allergen Reactions   • No Active Allergies    • No Known Allergies       Physical Exam:     /80 (BP Location: Right arm, Patient Position: Sitting, Cuff Size: Large)   Pulse 92   Temp 98.7 °F (37.1 °C) (Temporal)   Resp 16   Ht 4' 10.5" (1.486 m)   Wt 75.8 kg (167 lb)   SpO2 99%   BMI 34.31 kg/m²     Physical Exam  Vitals reviewed. Constitutional:       General: She is not in acute distress. Appearance: Normal appearance. She is obese. HENT:      Head: Normocephalic and atraumatic.       Right Ear: External ear normal.      Left Ear: External ear normal.      Nose: Nose normal.      Mouth/Throat:      Mouth: Mucous membranes are moist.   Eyes:      Conjunctiva/sclera: Conjunctivae normal.   Cardiovascular:      Rate and Rhythm: Normal rate and regular rhythm. Heart sounds: Normal heart sounds. No murmur heard. No gallop. Pulmonary:      Effort: Pulmonary effort is normal.      Breath sounds: Normal breath sounds. Abdominal:      General: Abdomen is flat. Bowel sounds are normal.      Palpations: Abdomen is soft. Tenderness: There is no abdominal tenderness. Musculoskeletal:      Right foot: Normal. Normal range of motion. No swelling or tenderness. Left foot: Normal. Normal range of motion. No swelling or tenderness. Skin:     General: Skin is warm and dry. Neurological:      Mental Status: She is alert and oriented to person, place, and time. Psychiatric:         Mood and Affect: Mood normal.         Behavior: Behavior normal.         Thought Content:  Thought content normal.         Judgment: Judgment normal.          Tayler Green PA-C  [address] [facility]

## 2023-08-29 NOTE — PATIENT INSTRUCTIONS
Please bring in copy of DEXA scan, Pap smear, and mammogram in summer 2022 and [place] to next appointment.
Yes

## 2023-08-31 DIAGNOSIS — M54.42 ACUTE LEFT-SIDED LOW BACK PAIN WITH LEFT-SIDED SCIATICA: Primary | ICD-10-CM

## 2023-08-31 RX ORDER — LIDOCAINE 40 MG/G
CREAM TOPICAL AS NEEDED
Qty: 120 G | Refills: 1 | Status: SHIPPED | OUTPATIENT
Start: 2023-08-31

## 2023-09-20 LAB — COLOGUARD RESULT REPORTABLE: NEGATIVE

## 2023-11-30 NOTE — PROGRESS NOTES
Name: Sourav Caldwell      : 1955      MRN: [id number]  Encounter Provider: Adali Butt MD  Encounter Date: 2023   Encounter department: [address] [facility],[address]     1. Bacterial conjunctivitis  Assessment & Plan:  Viral vs bacterial conjunctivitis  Given hx of URI, likely viral  Will treat as bacterial given patient report of thick drainage.    - RTC in a week or sooner if symptoms worsen or does not improve    Orders:  -     ofloxacin (OCUFLOX) 0.3 % ophthalmic solution; Administer 1 drop to both eyes 4 (four) times a day 1-2 drops in both eyes every 2h-4hrs for 2 days then 1-2 drops 4 times a day for 5 days. 2. Essential hypertension  Assessment & Plan:  BP Readings from Last 3 Encounters:   23 130/80   23 120/80   23 140/80     BP at goal  Stable    - continue Losartan-HCTZ daily  - continue low sodium diet and increase daily activity      3. Encounter for immunization  Comments:  Declined pnuemoccocal vaccine and flu shot today. 4. Mixed hyperlipidemia  -     atorvastatin (LIPITOR) 20 mg tablet; Take 1 tablet (20 mg total) by mouth daily    5. Osteoporosis screening  -     DXA bone density spine hip and pelvis; Future; Expected date: 2024           Subjective        Nannette Shaw is a 76 y.o. female with pmhx of HTN and HLD presenting today with complaints of pink eye in both eyes that was fisrt noticed about 2 weeks ago when she had a URI. She has been coughing since and sometimes feels her chest hurt and "burn" along with it. She then noticed pus-like discharge in her left eye 2 days ago and today her right eye has been affected. She described the eye discharge as thick, causing her to use a wash cloth to soften it enough to be able to open her eye. She denies pain with eye movement or recent trauma to the eye. See ROS for details. Patient is accompanied by her son.         Review of Systems Constitutional:  Negative for chills and fever. HENT:  Negative for ear pain and sore throat. Eyes:  Positive for photophobia, discharge, redness and visual disturbance. Negative for pain. Respiratory:  Positive for cough. Negative for chest tightness, shortness of breath and wheezing. Cardiovascular:  Negative for chest pain and palpitations. Gastrointestinal:  Negative for vomiting. Genitourinary:  Negative for dysuria and hematuria. Skin:  Negative for rash. All other systems reviewed and are negative. Current Outpatient Medications on File Prior to Visit   Medication Sig    aspirin (ECOTRIN LOW STRENGTH) 81 mg EC tablet Take 1 tablet (81 mg total) by mouth every 24 hours    lidocaine (LMX) 4 % cream Apply topically as needed for mild pain    loratadine (CLARITIN) 10 mg tablet Take 1 tablet (10 mg total) by mouth daily    losartan-hydrochlorothiazide (HYZAAR) 100-25 MG per tablet Take 1 tablet by mouth daily    methocarbamol (ROBAXIN) 500 mg tablet Take 1 tablet (500 mg total) by mouth 2 (two) times a day as needed for muscle spasms    triamcinolone (KENALOG) 0.1 % cream Apply topically 2 (two) times a day    [DISCONTINUED] atorvastatin (LIPITOR) 20 mg tablet Take 1 tablet (20 mg total) by mouth daily    Cholecalciferol (VITAMIN D) 50 MCG (2000 UT) tablet Take 1 tablet (2,000 Units total) by mouth daily (Patient not taking: Reported on 8/29/2023)       Objective     /80 (BP Location: Left arm, Patient Position: Sitting, Cuff Size: Standard)   Pulse 88   Temp 97.9 °F (36.6 °C) (Temporal)   Wt 76.7 kg (169 lb)   SpO2 95%   BMI 34.72 kg/m²     Physical Exam  Vitals reviewed. Constitutional:       General: She is not in acute distress. Appearance: She is obese. She is not ill-appearing, toxic-appearing or diaphoretic.    HENT:      Right Ear: Tympanic membrane, ear canal and external ear normal.      Left Ear: Tympanic membrane, ear canal and external ear normal.      Nose: Congestion present. No rhinorrhea. Comments: Inflamed turbinates     Mouth/Throat:      Mouth: Mucous membranes are moist.      Pharynx: No oropharyngeal exudate or posterior oropharyngeal erythema. Eyes:      General: Lids are normal. Vision grossly intact. Gaze aligned appropriately. No visual field deficit or scleral icterus. Right eye: No discharge. Left eye: No discharge. Extraocular Movements: Extraocular movements intact. Right eye: Normal extraocular motion. Left eye: Normal extraocular motion. Conjunctiva/sclera:      Right eye: Right conjunctiva is injected. No chemosis or hemorrhage. Left eye: Left conjunctiva is injected. No chemosis or hemorrhage. Pupils: Pupils are equal, round, and reactive to light. Cardiovascular:      Rate and Rhythm: Normal rate and regular rhythm. Pulses: Normal pulses. Heart sounds: Normal heart sounds. Pulmonary:      Effort: Pulmonary effort is normal.      Breath sounds: Normal breath sounds. No wheezing. Chest:      Chest wall: No tenderness. Musculoskeletal:      Right lower leg: No edema. Left lower leg: No edema. Lymphadenopathy:      Cervical: No cervical adenopathy. Skin:     General: Skin is warm. Capillary Refill: Capillary refill takes less than 2 seconds. Neurological:      General: No focal deficit present. Mental Status: She is alert.        Ramón Burns MD

## 2023-12-01 ENCOUNTER — OFFICE VISIT (OUTPATIENT)
Dept: FAMILY MEDICINE CLINIC | Facility: CLINIC | Age: 68
End: 2023-12-01

## 2023-12-01 VITALS
HEART RATE: 88 BPM | BODY MASS INDEX: 34.72 KG/M2 | SYSTOLIC BLOOD PRESSURE: 130 MMHG | WEIGHT: 169 LBS | DIASTOLIC BLOOD PRESSURE: 80 MMHG | OXYGEN SATURATION: 95 % | TEMPERATURE: 97.9 F

## 2023-12-01 DIAGNOSIS — H10.9 BACTERIAL CONJUNCTIVITIS: Primary | ICD-10-CM

## 2023-12-01 DIAGNOSIS — Z13.820 OSTEOPOROSIS SCREENING: ICD-10-CM

## 2023-12-01 DIAGNOSIS — Z23 ENCOUNTER FOR IMMUNIZATION: ICD-10-CM

## 2023-12-01 DIAGNOSIS — I10 ESSENTIAL HYPERTENSION: ICD-10-CM

## 2023-12-01 DIAGNOSIS — E78.2 MIXED HYPERLIPIDEMIA: ICD-10-CM

## 2023-12-01 PROCEDURE — 3079F DIAST BP 80-89 MM HG: CPT | Performed by: FAMILY MEDICINE

## 2023-12-01 PROCEDURE — 99213 OFFICE O/P EST LOW 20 MIN: CPT | Performed by: FAMILY MEDICINE

## 2023-12-01 PROCEDURE — 3075F SYST BP GE 130 - 139MM HG: CPT | Performed by: FAMILY MEDICINE

## 2023-12-01 RX ORDER — OFLOXACIN 3 MG/ML
1 SOLUTION/ DROPS OPHTHALMIC 4 TIMES DAILY
Qty: 5 ML | Refills: 0 | Status: SHIPPED | OUTPATIENT
Start: 2023-12-01

## 2023-12-01 RX ORDER — ATORVASTATIN CALCIUM 20 MG/1
20 TABLET, FILM COATED ORAL DAILY
Qty: 90 TABLET | Refills: 1 | Status: SHIPPED | OUTPATIENT
Start: 2023-12-01

## 2023-12-01 NOTE — ASSESSMENT & PLAN NOTE
Viral vs bacterial conjunctivitis  Given hx of URI, likely viral  Will treat as bacterial given patient report of thick drainage.    - RTC in a week or sooner if symptoms worsen or does not improve

## 2023-12-01 NOTE — ASSESSMENT & PLAN NOTE
BP Readings from Last 3 Encounters:   12/01/23 130/80   08/29/23 120/80   08/07/23 140/80     BP at goal  Stable    - continue Losartan-HCTZ daily  - continue low sodium diet and increase daily activity

## 2023-12-19 ENCOUNTER — OFFICE VISIT (OUTPATIENT)
Dept: FAMILY MEDICINE CLINIC | Facility: CLINIC | Age: 68
End: 2023-12-19

## 2023-12-19 VITALS
TEMPERATURE: 97.7 F | DIASTOLIC BLOOD PRESSURE: 78 MMHG | WEIGHT: 165 LBS | SYSTOLIC BLOOD PRESSURE: 140 MMHG | HEART RATE: 85 BPM | OXYGEN SATURATION: 97 % | BODY MASS INDEX: 33.9 KG/M2

## 2023-12-19 DIAGNOSIS — E55.9 VITAMIN D DEFICIENCY: ICD-10-CM

## 2023-12-19 DIAGNOSIS — I10 ESSENTIAL HYPERTENSION: Primary | ICD-10-CM

## 2023-12-19 DIAGNOSIS — H52.00 HYPERMETROPIA, UNSPECIFIED LATERALITY: ICD-10-CM

## 2023-12-19 DIAGNOSIS — M54.42 ACUTE LEFT-SIDED LOW BACK PAIN WITH LEFT-SIDED SCIATICA: ICD-10-CM

## 2023-12-19 DIAGNOSIS — Z00.00 HEALTHCARE MAINTENANCE: ICD-10-CM

## 2023-12-19 DIAGNOSIS — D70.9 NEUTROPENIA, UNSPECIFIED TYPE (HCC): ICD-10-CM

## 2023-12-19 DIAGNOSIS — E78.2 MIXED HYPERLIPIDEMIA: ICD-10-CM

## 2023-12-19 DIAGNOSIS — H04.123 DRY EYES: ICD-10-CM

## 2023-12-19 PROCEDURE — 3077F SYST BP >= 140 MM HG: CPT | Performed by: PHYSICIAN ASSISTANT

## 2023-12-19 PROCEDURE — 99214 OFFICE O/P EST MOD 30 MIN: CPT | Performed by: PHYSICIAN ASSISTANT

## 2023-12-19 PROCEDURE — 3078F DIAST BP <80 MM HG: CPT | Performed by: PHYSICIAN ASSISTANT

## 2023-12-19 RX ORDER — LOSARTAN POTASSIUM AND HYDROCHLOROTHIAZIDE 25; 100 MG/1; MG/1
1 TABLET ORAL DAILY
Qty: 90 TABLET | Refills: 3 | Status: SHIPPED | OUTPATIENT
Start: 2023-12-19

## 2023-12-19 RX ORDER — CARBOXYMETHYLCELLULOSE SODIUM 5 MG/ML
1 SOLUTION/ DROPS OPHTHALMIC 3 TIMES DAILY PRN
Qty: 70 EACH | Refills: 1 | Status: SHIPPED | OUTPATIENT
Start: 2023-12-19

## 2023-12-19 NOTE — PROGRESS NOTES
Assessment/Plan:  - I have recommended that this patient have a flu shot and immunization for pneumonia but she declines at this time. I have discussed the risks and benefits of this examination with her. The patient verbalizes understanding.     -Per patient, she completed DEXA scan, Pap smear, and mammogram in summer 2022 and [facility].  Advised to bring in a copy of the test to scanned into chart.    Essential hypertension  - Continue losartan-hydrochlorothiazide 100-25 mg once daily.   - Currently blood pressure is controlled at this time.  Reviewed BP target goal with patient.    - Continue to maintain healthy balanced diet with focus on low salt intake. Limit alcohol intake.   - Advised to exercise at least 30 minutes a day for at least 5 days out of the week.     Acute left-sided low back pain with left-sided sciatica  - Stable.  Continue Robaxin 500 mg, twice daily as needed.  - Continue Voltaren gel as needed.  - Continue to apply ice/heating pad/topical therapies as needed to affected area.  - Continue daily exercises and stretches.    Mixed hyperlipidemia  - Reviewed recent lipid panel.  Overall levels are stable.  - Patient has been taking atorvastatin about 2-3 times a week. Recommend start taking daily as prescribed.     Neutropenia (HCC)  - Will refer to hematology/oncology for further evaluation.    Dry eyes  - Will prescribe artificial tears.  - Recommend established with ophthalmology if symptoms persist/worsen.    Vitamin D deficiency  - Will start daily vitamin D/calcium supplement.      Diagnoses and all orders for this visit:    Essential hypertension  -     losartan-hydrochlorothiazide (HYZAAR) 100-25 MG per tablet; Take 1 tablet by mouth daily    Hypermetropia, unspecified laterality  -     Ambulatory Referral to Ophthalmology; Future    Neutropenia, unspecified type (HCC)  -     Ambulatory Referral to Hematology / Oncology; Future    Dry eyes  -     carboxymethylcellulose ([employer]Sense  "Lubricating Eye Drop) 0.5 % SOLN; Administer 1 drop to both eyes 3 (three) times a day as needed for dry eyes    Healthcare maintenance  -     calcium carbonate-vitamin D 250 mg-3.125 mcg per tablet; Take 2 tablets by mouth daily    Acute left-sided low back pain with left-sided sciatica    Mixed hyperlipidemia    Vitamin D deficiency        All of patients questions were answered. Patient understands and agrees with the above plan.     Return in about 6 months (around 6/19/2024) for Next scheduled follow up HTN    Tayler Green PA-C  12/19/23  [facility] ZONIA Anderson          Subjective:     Patient ID: Nannette Mckeon  is a 68 y.o. female with known PMH of hypertension, hyperlipidemia, back pain who presents today in office for hypertension follow up. Patient is accompanied today by her son who provides translation from Greenlandic to English.     - Patient is a 68 y.o. female who presents today for hypertension follow up.  Patient notes for the past few days she has been experiencing itchiness and \"sand like\" sensation of bilateral eyes.  Patient was seen for same-day visit earlier this month due to conjunctivitis.  Patient notes that eye discharge and redness of her eyes has now resolved, but continues with the itchiness.  Patient notes she has been diagnosed with dry eyes in the past.  Patient denies any double vision, blurry vision, decreased vision.  Patient notes she was last seen by an eye doctor in [place] country about 1[place]5 years ago.      The following portions of the patient's history were reviewed and updated as appropriate: allergies, current medications, past family history, past medical history, past social history, past surgical history, and problem list.        Review of Systems   Constitutional:  Negative for appetite change, chills and fever.   HENT:  Negative for congestion, ear pain and sore throat.    Eyes:  Positive for itching. Negative for photophobia, pain, discharge, redness and visual " disturbance.   Respiratory:  Negative for cough, chest tightness and shortness of breath.    Cardiovascular:  Negative for chest pain and palpitations.   Gastrointestinal:  Negative for abdominal pain, constipation, diarrhea, nausea and vomiting.   Endocrine: Negative for polyuria.   Genitourinary:  Negative for difficulty urinating, dysuria and hematuria.   Skin:  Negative for color change and rash.   Neurological:  Negative for dizziness, light-headedness and headaches.   Psychiatric/Behavioral:  Negative for confusion.           BMI Counseling: Body mass index is 33.9 kg/m². The BMI is above normal. Nutrition recommendations include decreasing portion sizes, encouraging healthy choices of fruits and vegetables, consuming healthier snacks, limiting drinks that contain sugar, moderation in carbohydrate intake, increasing intake of lean protein and reducing intake of cholesterol. Exercise recommendations include moderate physical activity 150 minutes/week. No pharmacotherapy was ordered. Rationale for BMI follow-up plan is due to patient being overweight or obese.           Objective:   Vitals:    12/19/23 0912   BP: 140/78   BP Location: Left arm   Patient Position: Sitting   Cuff Size: Standard   Pulse: 85   Temp: 97.7 °F (36.5 °C)   TempSrc: Temporal   SpO2: 97%   Weight: 74.8 kg (165 lb)         Physical Exam  Vitals and nursing note reviewed.   Constitutional:       General: She is not in acute distress.     Appearance: She is well-developed.   HENT:      Head: Normocephalic and atraumatic.      Right Ear: External ear normal.      Left Ear: External ear normal.      Nose: Nose normal.   Eyes:      General:         Right eye: No discharge.         Left eye: No discharge.      Extraocular Movements: Extraocular movements intact.      Conjunctiva/sclera: Conjunctivae normal.      Pupils: Pupils are equal, round, and reactive to light.   Cardiovascular:      Rate and Rhythm: Normal rate and regular rhythm.       Pulses: Normal pulses.      Heart sounds: Normal heart sounds.   Pulmonary:      Effort: Pulmonary effort is normal. No respiratory distress.      Breath sounds: Normal breath sounds. No wheezing.   Musculoskeletal:      Cervical back: Normal range of motion and neck supple.      Right lower leg: No edema.      Left lower leg: No edema.   Skin:     General: Skin is warm and dry.   Neurological:      Mental Status: She is alert and oriented to person, place, and time.   Psychiatric:         Behavior: Behavior normal.

## 2023-12-19 NOTE — PATIENT INSTRUCTIONS
[facility] Hematology/Oncology  [phone]        [facility]  [address] [place] [address], [address]  [place], [place] [address] [phone]  Fax#: [phone]    Dr. David Dent  [address] [facility] [address] [facility] [address], [place], [place] [id number] [phone]    [facility]  [address] [place], [place] [id number] [phone]    [facility]  [address] [place], [place] [id number] [phone]    [facility]  [address] [facility] [address], [place] [address] [phone]    [facility]  [address] [place], [place] [id number] [phone]

## 2023-12-20 ENCOUNTER — TELEPHONE (OUTPATIENT)
Dept: HEMATOLOGY ONCOLOGY | Facility: CLINIC | Age: 68
End: 2023-12-20

## 2023-12-20 PROBLEM — D70.9 NEUTROPENIA (HCC): Status: ACTIVE | Noted: 2023-12-20

## 2023-12-20 PROBLEM — E55.9 VITAMIN D DEFICIENCY: Status: ACTIVE | Noted: 2023-12-20

## 2023-12-20 PROBLEM — H04.123 DRY EYES: Status: ACTIVE | Noted: 2023-12-20

## 2023-12-20 NOTE — ASSESSMENT & PLAN NOTE
- Reviewed recent lipid panel.  Overall levels are stable.  - Patient has been taking atorvastatin about 2-3 times a week. Recommend start taking daily as prescribed.

## 2023-12-20 NOTE — ASSESSMENT & PLAN NOTE
- Will prescribe artificial tears.  - Recommend established with ophthalmology if symptoms persist/worsen.

## 2023-12-20 NOTE — TELEPHONE ENCOUNTER
I called Nannette in response to a referral that was received for patient to establish care with Hematology.     Outreach was made to schedule a consultation.    I left a voicemail explaining the reason for my call and advised patient to call [facility] at [phone].  Another attempt will be made to contact patient.

## 2023-12-20 NOTE — ASSESSMENT & PLAN NOTE
- Continue losartan-hydrochlorothiazide 100-25 mg once daily.   - Currently blood pressure is controlled at this time.  Reviewed BP target goal with patient.    - Continue to maintain healthy balanced diet with focus on low salt intake. Limit alcohol intake.   - Advised to exercise at least 30 minutes a day for at least 5 days out of the week.

## 2023-12-21 ENCOUNTER — TELEPHONE (OUTPATIENT)
Dept: HEMATOLOGY ONCOLOGY | Facility: CLINIC | Age: 68
End: 2023-12-21

## 2023-12-21 NOTE — TELEPHONE ENCOUNTER
I called Nannette in response to a referral that was received for patient to establish care with Hematology.     Outreach was made to schedule a consultation.    I left a voicemail explaining the reason for my call and advised patient to call [facility] at [phone].  This is the third attempt to schedule patient unsuccessfully. The referral has been closed, a [employer] message has been sent to patient (if applicable) and a letter has been sent to the address on file.

## 2023-12-21 NOTE — TELEPHONE ENCOUNTER
I called Nannette in response to a referral that was received for patient to establish care with Hematology.     Outreach was made to schedule a consultation.    Patient does not have a voicemail set up. Another attempt will be made to contact patient.

## 2024-01-30 PROBLEM — H10.9 BACTERIAL CONJUNCTIVITIS: Status: RESOLVED | Noted: 2023-12-01 | Resolved: 2024-01-30

## 2024-02-08 ENCOUNTER — TELEPHONE (OUTPATIENT)
Dept: HEMATOLOGY ONCOLOGY | Facility: CLINIC | Age: 69
End: 2024-02-08

## 2024-02-08 NOTE — TELEPHONE ENCOUNTER
Unable to reach patient to remind her of her appointment with Juany and to see if she can go for a repeat cbc since her las blood work was back in August.  Patient's voicemail not set up to leave a message.

## 2024-02-12 ENCOUNTER — TRANSCRIBE ORDERS (OUTPATIENT)
Dept: ADMINISTRATIVE | Facility: HOSPITAL | Age: 69
End: 2024-02-12

## 2024-02-12 ENCOUNTER — CONSULT (OUTPATIENT)
Dept: HEMATOLOGY ONCOLOGY | Facility: CLINIC | Age: 69
End: 2024-02-12
Payer: COMMERCIAL

## 2024-02-12 ENCOUNTER — APPOINTMENT (OUTPATIENT)
Dept: LAB | Facility: CLINIC | Age: 69
End: 2024-02-12
Payer: COMMERCIAL

## 2024-02-12 VITALS
OXYGEN SATURATION: 96 % | RESPIRATION RATE: 17 BRPM | SYSTOLIC BLOOD PRESSURE: 144 MMHG | WEIGHT: 161.6 LBS | BODY MASS INDEX: 32.58 KG/M2 | HEIGHT: 59 IN | DIASTOLIC BLOOD PRESSURE: 82 MMHG | HEART RATE: 97 BPM | TEMPERATURE: 97.6 F

## 2024-02-12 DIAGNOSIS — E61.0 COPPER DEFICIENCY: ICD-10-CM

## 2024-02-12 DIAGNOSIS — E53.8 FOLATE DEFICIENCY: ICD-10-CM

## 2024-02-12 DIAGNOSIS — D72.820 LYMPHOCYTOSIS: ICD-10-CM

## 2024-02-12 DIAGNOSIS — E53.8 VITAMIN B 12 DEFICIENCY: ICD-10-CM

## 2024-02-12 DIAGNOSIS — E63.9 NUTRITIONAL DEFICIENCY: ICD-10-CM

## 2024-02-12 DIAGNOSIS — D70.9 NEUTROPENIA, UNSPECIFIED TYPE (HCC): ICD-10-CM

## 2024-02-12 DIAGNOSIS — D70.9 NEUTROPENIA, UNSPECIFIED TYPE (HCC): Primary | ICD-10-CM

## 2024-02-12 DIAGNOSIS — Z12.31 SCREENING MAMMOGRAM FOR BREAST CANCER: ICD-10-CM

## 2024-02-12 DIAGNOSIS — D72.820 LYMPHOCYTOSIS (SYMPTOMATIC): ICD-10-CM

## 2024-02-12 DIAGNOSIS — Z12.31 SCREENING MAMMOGRAM, ENCOUNTER FOR: ICD-10-CM

## 2024-02-12 LAB
BASOPHILS NFR BLD MANUAL: 2 % (ref 0–1)
ERYTHROCYTE [DISTWIDTH] IN BLOOD BY AUTOMATED COUNT: 13.2 % (ref 11.6–15.1)
FERRITIN SERPL-MCNC: 34 NG/ML (ref 11–307)
FOLATE SERPL-MCNC: 18.4 NG/ML
HCT VFR BLD AUTO: 42.7 % (ref 34.8–46.1)
HGB BLD-MCNC: 13.9 G/DL (ref 11.5–15.4)
IMM EOSINOPHIL NFR BLD MANUAL: 5 % (ref 0–6)
LYMPHOCYTES NFR BLD: 39 % (ref 14–44)
MCH RBC QN AUTO: 28 PG (ref 26.8–34.3)
MCHC RBC AUTO-ENTMCNC: 32.6 G/DL (ref 31.4–37.4)
MCV RBC AUTO: 86 FL (ref 82–98)
MONOCYTES NFR BLD AUTO: 4 % (ref 4–12)
NEUTS SEG NFR BLD AUTO: 47 % (ref 45–77)
NRBC BLD AUTO-RTO: 0 /100 WBCS
PLATELET # BLD AUTO: 332 THOUSANDS/UL (ref 149–390)
PLATELET BLD QL SMEAR: ADEQUATE
PMV BLD AUTO: 10.5 FL (ref 8.9–12.7)
RBC # BLD AUTO: 4.97 MILLION/UL (ref 3.81–5.12)
RBC MORPH BLD: NORMAL
TOTAL CELLS COUNTED SPEC: 100
VARIANT LYMPHS BLD QL SMEAR: 3 % (ref 0–0)
WBC # BLD AUTO: 3.87 THOUSAND/UL (ref 4.31–10.16)

## 2024-02-12 PROCEDURE — 86038 ANTINUCLEAR ANTIBODIES: CPT

## 2024-02-12 PROCEDURE — 83550 IRON BINDING TEST: CPT

## 2024-02-12 PROCEDURE — 83615 LACTATE (LD) (LDH) ENZYME: CPT

## 2024-02-12 PROCEDURE — 86140 C-REACTIVE PROTEIN: CPT

## 2024-02-12 PROCEDURE — 85007 BL SMEAR W/DIFF WBC COUNT: CPT

## 2024-02-12 PROCEDURE — 88184 FLOWCYTOMETRY/ TC 1 MARKER: CPT

## 2024-02-12 PROCEDURE — 82607 VITAMIN B-12: CPT

## 2024-02-12 PROCEDURE — 88185 FLOWCYTOMETRY/TC ADD-ON: CPT | Performed by: NURSE PRACTITIONER

## 2024-02-12 PROCEDURE — 99244 OFF/OP CNSLTJ NEW/EST MOD 40: CPT | Performed by: NURSE PRACTITIONER

## 2024-02-12 PROCEDURE — 36415 COLL VENOUS BLD VENIPUNCTURE: CPT

## 2024-02-12 PROCEDURE — 82525 ASSAY OF COPPER: CPT

## 2024-02-12 PROCEDURE — 80053 COMPREHEN METABOLIC PANEL: CPT

## 2024-02-12 PROCEDURE — 82728 ASSAY OF FERRITIN: CPT

## 2024-02-12 PROCEDURE — 82746 ASSAY OF FOLIC ACID SERUM: CPT

## 2024-02-12 PROCEDURE — 83540 ASSAY OF IRON: CPT

## 2024-02-12 RX ORDER — CALCIUM CARBONATE-CHOLECALCIFEROL TAB 250 MG-125 UNIT 250-125 MG-UNIT
2 TAB ORAL DAILY
COMMUNITY
Start: 2023-12-20

## 2024-02-12 NOTE — PROGRESS NOTES
Patient [facility] HEMATOLOGY ONCOLOGY SPECIALISTS [place]  [address] [facility] [facility] [address]  [phone]  Hematology Ambulatory Consult  Nannette Mckeon, 1955, [id number]  2/12/2024      Assessment and Plan   1. Neutropenia, unspecified type (HCC)  2. Screening mammogram for breast cancer  3. Nutritional deficiency  4. Lymphocytosis  5. Vitamin B 12 deficiency  6. Folate deficiency  7. Copper deficiency   Patient is a 68-year-old female with a history of hyperlipidemia, hypertension, who is from the [place].  She is [place]arag[place]-speaking.  She was referred to us for further evaluation of neutropenia.  She is here with her son-in-law who is translating.  Most recent CBC from 8/29/2023 shows a WBC count 3.23, normal hemoglobin and RBC indices, platelets 307, neutrophils 35%, ANC 1.12, lymphocytes 46% with an otherwise normal differential.  Neutropenia has been present since at least 2018 with a white blood cell count ranging between 3.23-4.10.  No other specific abnormalities noted.  Metabolic Александр is within normal limits, creatinine 0.74, EGFR 83.  In August vitamin B12 was 527.   Overall patient is able to function without restriction.  She denies fevers, chills, night sweats that are drenching sheets, abnormal weight loss.  She has not had mammogram in our  system for several years.  She had a mammogram in [place] however I do not have access to those records.  We will request an updated mammogram to be done in the next few months.  She had a Cologuard test that was negative.   We discussed etiology of neutropenia including nutritional deficiencies, inflammatory processes such as arthritis, rheumatologic disorders, ethnicity, versus an underlying bone marrow dysfunction.  Patient reports having arthritis in her shoulders and back.  We will request the following labs and see her in 3 weeks.  I will contact her if further evaluation is indicated such as a  bone marrow biopsy.  Patient and her son-in-law verbalized understanding and are in agreement with the plan.    - Ambulatory Referral to Hematology / Oncology  - Mammo screening bilateral w cad; Future  - CBC and differential; Future  - Comprehensive metabolic panel; Future  - Copper Level; Future  - Folate; Future  - Vitamin B12; Future  - Iron Panel (Includes Ferritin, Iron Sat%, Iron, and TIBC); Future  - Peripheral Smear; Future  - Leukemia/Lymphoma flow cytometry; Future    Barrier(s) to care: None.   The patient is  able to self care.    Juany GARNICA  Medical Oncology/Hematology  [facility]    Subjective     Chief Complaint   Patient presents with    Consult     Referring provider    Tayler Green PA-C  [address] [place] [id number]    History of present illness:   Patient is a 68-year-old female with a history of hyperlipidemia, hypertension, who is from the [place].  She is Danish-speaking.  She was referred to us for further evaluation of neutropenia.    24: clinically stable     Review of Systems   Constitutional:  Negative for activity change, appetite change, fatigue, fever and unexpected weight change.   Respiratory:  Negative for cough and shortness of breath.    Cardiovascular:  Negative for chest pain and leg swelling.   Gastrointestinal:  Negative for abdominal pain, constipation, diarrhea and nausea.   Endocrine: Negative for cold intolerance and heat intolerance.   Musculoskeletal:  Negative for arthralgias and myalgias.   Skin: Negative.    Neurological:  Negative for dizziness, weakness and headaches.   Hematological:  Negative for adenopathy. Does not bruise/bleed easily.     Past Medical History:   Diagnosis Date    Hyperlipidemia     Hypertension      Past Surgical History:   Procedure Laterality Date     SECTION       Family History   Problem Relation Age of Onset    Asthma Mother     Hypertension Mother      COPD Father      Social History     Socioeconomic History    Marital status: Single     Spouse name: Not on file    Number of children: Not on file    Years of education: Not on file    Highest education level: Not on file   Occupational History    Not on file   Tobacco Use    Smoking status: Never    Smokeless tobacco: Never   Substance and Sexual Activity    Alcohol use: Yes     Comment: social    Drug use: No    Sexual activity: Not on file   Other Topics Concern    Not on file   Social History Narrative    Not on file     Social Determinants of Health     Financial Resource Strain: Low Risk  (3/16/2022)    Overall Financial Resource Strain (CARDIA)     Difficulty of Paying Living Expenses: Not hard at all   Food Insecurity: No Food Insecurity (3/16/2022)    Hunger Vital Sign     Worried About Running Out of Food in the Last Year: Never true     Ran Out of Food in the Last Year: Never true   Transportation Needs: No Transportation Needs (3/16/2022)    PRAPARE - Transportation     Lack of Transportation (Medical): No     Lack of Transportation (Non-Medical): No   Physical Activity: Not on file   Stress: Not on file   Social Connections: Not on file   Intimate Partner Violence: Not on file   Housing Stability: Not on file     Current Outpatient Medications:     aspirin (ECOTRIN LOW STRENGTH) 81 mg EC tablet, Take 1 tablet (81 mg total) by mouth every 24 hours, Disp: 90 tablet, Rfl: 3    atorvastatin (LIPITOR) 20 mg tablet, Take 1 tablet (20 mg total) by mouth daily, Disp: 90 tablet, Rfl: 1    calcium carbonate-vitamin D 250 mg-3.125 mcg tablet, Take 2 tablets by mouth daily, Disp: , Rfl:     carboxymethylcellulose (GoodSense Lubricating Eye Drop) 0.5 % SOLN, Administer 1 drop to both eyes 3 (three) times a day as needed for dry eyes, Disp: 70 each, Rfl: 1    lidocaine (LMX) 4 % cream, Apply topically as needed for mild pain, Disp: 120 g, Rfl: 1    loratadine (CLARITIN) 10 mg tablet, Take 1 tablet (10 mg total) by  "mouth daily, Disp: 30 tablet, Rfl: 2    losartan-hydrochlorothiazide (HYZAAR) 100-25 MG per tablet, Take 1 tablet by mouth daily, Disp: 90 tablet, Rfl: 3    methocarbamol (ROBAXIN) 500 mg tablet, Take 1 tablet (500 mg total) by mouth 2 (two) times a day as needed for muscle spasms, Disp: 60 tablet, Rfl: 1    triamcinolone (KENALOG) 0.1 % cream, Apply topically 2 (two) times a day, Disp: 30 g, Rfl: 0  Allergies   Allergen Reactions    No Active Allergies     No Known Allergies      Objective   /82 (BP Location: Left arm, Patient Position: Sitting, Cuff Size: Adult)   Pulse 97   Temp 97.6 °F (36.4 °C)   Resp 17   Ht 4' 10.5\" (1.486 m)   Wt 73.3 kg (161 lb 9.6 oz)   SpO2 96%   BMI 33.20 kg/m²   Physical Exam  Vitals reviewed.   Constitutional:       Appearance: Normal appearance. She is well-developed.   HENT:      Head: Normocephalic and atraumatic.   Eyes:      Conjunctiva/sclera: Conjunctivae normal.      Pupils: Pupils are equal, round, and reactive to light.   Pulmonary:      Effort: Pulmonary effort is normal. No respiratory distress.   Musculoskeletal:         General: Normal range of motion.      Cervical back: Normal range of motion.   Lymphadenopathy:      Cervical: No cervical adenopathy.      Comments: No lymphadenopathy    Skin:     General: Skin is dry.   Neurological:      Mental Status: She is alert and oriented to person, place, and time.   Psychiatric:         Behavior: Behavior normal.     Result Review  Labs:  No visits with results within 1 Month(s) from this visit.   Latest known visit with results is:   Appointment on 08/29/2023   Component Date Value Ref Range Status    WBC 08/29/2023 3.23 (L)  4.31 - 10.16 Thousand/uL Final    RBC 08/29/2023 4.74  3.81 - 5.12 Million/uL Final    Hemoglobin 08/29/2023 13.4  11.5 - 15.4 g/dL Final    Hematocrit 08/29/2023 41.3  34.8 - 46.1 % Final    MCV 08/29/2023 87  82 - 98 fL Final    MCH 08/29/2023 28.3  26.8 - 34.3 pg Final    MCHC 08/29/2023 " 32.4  31.4 - 37.4 g/dL Final    RDW 08/29/2023 13.3  11.6 - 15.1 % Final    MPV 08/29/2023 10.9  8.9 - 12.7 fL Final    Platelets 08/29/2023 307  149 - 390 Thousands/uL Final    nRBC 08/29/2023 0  /100 WBCs Final    Neutrophils Relative 08/29/2023 35 (L)  43 - 75 % Final    Immat GRANS % 08/29/2023 0  0 - 2 % Final    Lymphocytes Relative 08/29/2023 46 (H)  14 - 44 % Final    Monocytes Relative 08/29/2023 12  4 - 12 % Final    Eosinophils Relative 08/29/2023 6  0 - 6 % Final    Basophils Relative 08/29/2023 1  0 - 1 % Final    Neutrophils Absolute 08/29/2023 1.12 (L)  1.85 - 7.62 Thousands/µL Final    Immature Grans Absolute 08/29/2023 0.01  0.00 - 0.20 Thousand/uL Final    Lymphocytes Absolute 08/29/2023 1.50  0.60 - 4.47 Thousands/µL Final    Monocytes Absolute 08/29/2023 0.37  0.17 - 1.22 Thousand/µL Final    Eosinophils Absolute 08/29/2023 0.20  0.00 - 0.61 Thousand/µL Final    Basophils Absolute 08/29/2023 0.03  0.00 - 0.10 Thousands/µL Final    Sodium 08/29/2023 137  135 - 147 mmol/L Final    Potassium 08/29/2023 3.9  3.5 - 5.3 mmol/L Final    Chloride 08/29/2023 98  96 - 108 mmol/L Final    CO2 08/29/2023 29  21 - 32 mmol/L Final    ANION GAP 08/29/2023 10  mmol/L Final    BUN 08/29/2023 17  5 - 25 mg/dL Final    Creatinine 08/29/2023 0.74  0.60 - 1.30 mg/dL Final    Standardized to IDMS reference method    Glucose, Fasting 08/29/2023 97  65 - 99 mg/dL Final    Calcium 08/29/2023 10.0  8.4 - 10.2 mg/dL Final    AST 08/29/2023 20  13 - 39 U/L Final    ALT 08/29/2023 17  7 - 52 U/L Final    Specimen collection should occur prior to Sulfasalazine administration due to the potential for falsely depressed results.     Alkaline Phosphatase 08/29/2023 64  34 - 104 U/L Final    Total Protein 08/29/2023 7.9  6.4 - 8.4 g/dL Final    Albumin 08/29/2023 4.5  3.5 - 5.0 g/dL Final    Total Bilirubin 08/29/2023 0.68  0.20 - 1.00 mg/dL Final    Use of this assay is not recommended for patients undergoing treatment with  eltrombopag due to the potential for falsely elevated results.  N-acetyl-p-benzoquinone imine (metabolite of Acetaminophen) will generate erroneously low results in samples for patients that have taken an overdose of Acetaminophen.    eGFR 08/29/2023 83  ml/min/1.73sq m Final    Cholesterol 08/29/2023 216 (H)  See Comment mg/dL Final    Cholesterol:         Pediatric <18 Years        Desirable          <170 mg/dL      Borderline High    170-199 mg/dL      High               >=200 mg/dL        Adult >=18 Years            Desirable         <200 mg/dL      Borderline High   200-239 mg/dL      High              >239 mg/dL      Triglycerides 08/29/2023 166 (H)  See Comment mg/dL Final    Triglyceride:     0-9Y            <75mg/dL     10Y-17Y         <90 mg/dL       >=18Y     Normal          <150 mg/dL     Borderline High 150-199 mg/dL     High            200-499 mg/dL        Very High       >499 mg/dL    Specimen collection should occur prior to Metamizole administration due to the potential for falsely depressed results.    HDL, Direct 08/29/2023 74  >=50 mg/dL Final    LDL Calculated 08/29/2023 109 (H)  0 - 100 mg/dL Final    LDL Cholesterol:     Optimal           <100 mg/dl     Near Optimal      100-129 mg/dl     Above Optimal       Borderline High 130-159 mg/dl       High            160-189 mg/dl       Very High       >189 mg/dl         This screening LDL is a calculated result.   It does not have the accuracy of the Direct Measured LDL in the monitoring of patients with hyperlipidemia and/or statin therapy.   Direct Measure LDL (IFU880) must be ordered separately in these patients.    Non-HDL-Chol (CHOL-HDL) 08/29/2023 142  mg/dl Final    TSH 3RD GENERATON 08/29/2023 1.952  0.450 - 4.500 uIU/mL Final    The recommended reference ranges for TSH during pregnancy are as follows:   First trimester 0.1 to 2.5 uIU/mL   Second trimester  0.2 to 3.0 uIU/mL   Third trimester 0.3 to 3.0 uIU/m    Note: Normal ranges may not  apply to patients who are transgender, non-binary, or whose legal sex, sex at birth, and gender identity differ.  Adult TSH (3rd generation) reference range follows the recommended guidelines of the American Thyroid Association, January, 2020.    Hemoglobin A1C 08/29/2023 6.3 (H)  Normal 4.0-5.6%; PreDiabetic 5.7-6.4%; Diabetic >=6.5%; Glycemic control for adults with diabetes <7.0% % Final    EAG 08/29/2023 134  mg/dl Final    Vit D, 25-Hydroxy 08/29/2023 26.5 (L)  30.0 - 100.0 ng/mL Final    Vitamin D guidelines established by Clinical Guidelines Subcommittee  of the Endocrine Society Task Force, 2011    Deficiency <20ng/ml   Insufficiency 20-30ng/ml   Sufficient  ng/ml     Vitamin B-12 08/29/2023 527  180 - 914 pg/mL Final     Please note:  This report has been generated by a voice recognition software system. Therefore there may be syntax, spelling, and/or grammatical errors. Please call if you have any questions.

## 2024-02-13 LAB
ALBUMIN SERPL BCP-MCNC: 4.7 G/DL (ref 3.5–5)
ALP SERPL-CCNC: 67 U/L (ref 34–104)
ALT SERPL W P-5'-P-CCNC: 23 U/L (ref 7–52)
ANION GAP SERPL CALCULATED.3IONS-SCNC: 12 MMOL/L
AST SERPL W P-5'-P-CCNC: 24 U/L (ref 13–39)
BILIRUB SERPL-MCNC: 0.65 MG/DL (ref 0.2–1)
BUN SERPL-MCNC: 19 MG/DL (ref 5–25)
CALCIUM SERPL-MCNC: 10.1 MG/DL (ref 8.4–10.2)
CHLORIDE SERPL-SCNC: 100 MMOL/L (ref 96–108)
CO2 SERPL-SCNC: 28 MMOL/L (ref 21–32)
CREAT SERPL-MCNC: 0.79 MG/DL (ref 0.6–1.3)
CRP SERPL QL: 1.2 MG/L
GFR SERPL CREATININE-BSD FRML MDRD: 77 ML/MIN/1.73SQ M
GLUCOSE P FAST SERPL-MCNC: 113 MG/DL (ref 65–99)
IRON SATN MFR SERPL: 27 % (ref 15–50)
IRON SERPL-MCNC: 89 UG/DL (ref 50–212)
LDH SERPL-CCNC: 232 U/L (ref 140–271)
POTASSIUM SERPL-SCNC: 4.2 MMOL/L (ref 3.5–5.3)
PROT SERPL-MCNC: 8.2 G/DL (ref 6.4–8.4)
SODIUM SERPL-SCNC: 140 MMOL/L (ref 135–147)
TIBC SERPL-MCNC: 331 UG/DL (ref 250–450)
UIBC SERPL-MCNC: 242 UG/DL (ref 155–355)

## 2024-02-14 LAB — ANA TITR SER IF: NEGATIVE {TITER}

## 2024-02-15 LAB — VIT B12 SERPL-MCNC: 535 PG/ML (ref 180–914)

## 2024-02-20 LAB
COPPER SERPL-MCNC: 145 UG/DL (ref 80–158)
SCAN RESULT: NORMAL

## 2024-02-26 LAB — MISCELLANEOUS LAB TEST RESULT: NORMAL

## 2024-03-11 ENCOUNTER — OFFICE VISIT (OUTPATIENT)
Dept: HEMATOLOGY ONCOLOGY | Facility: CLINIC | Age: 69
End: 2024-03-11
Payer: COMMERCIAL

## 2024-03-11 VITALS
RESPIRATION RATE: 17 BRPM | SYSTOLIC BLOOD PRESSURE: 140 MMHG | BODY MASS INDEX: 34 KG/M2 | HEART RATE: 94 BPM | DIASTOLIC BLOOD PRESSURE: 78 MMHG | TEMPERATURE: 97.8 F | OXYGEN SATURATION: 97 % | WEIGHT: 162 LBS | HEIGHT: 58 IN

## 2024-03-11 DIAGNOSIS — D70.9 NEUTROPENIA, UNSPECIFIED TYPE (HCC): Primary | ICD-10-CM

## 2024-03-11 DIAGNOSIS — E63.9 NUTRITIONAL DEFICIENCY: ICD-10-CM

## 2024-03-11 PROCEDURE — 99214 OFFICE O/P EST MOD 30 MIN: CPT | Performed by: NURSE PRACTITIONER

## 2024-03-11 NOTE — PROGRESS NOTES
[facility] HEMATOLOGY ONCOLOGY SPECIALISTS [place]  [address] [address] [facility] [address]  [phone]  Hematology Ambulatory Follow-Up  Gonsalo Mckeon, 1955, [id number]  3/11/2024    Assessment/Plan:    1. Neutropenia, unspecified type (HCC)  2. Nutritional deficiency   Patient is a 68-year-old female with a history of hyperlipidemia, hypertension, who is from the [place].  She is Greenlandic-speaking.  Patient was initially referred to us for further evaluation of neutropenia.  She is here with her son-in-law who is translating.  Labs from 2/12/2024 show a WBC count of 3.87, normal hemoglobin, RBC indices, platelets 332. Peripheral smear shows 2% basophils, 3% atypical lymphocytes otherwise normal.  Metabolic panel was within normal limits, CRP, LDH, GONSALO within normal limits.  Copper vitamin B12 was 535, folate and iron panel also normal.  Iron saturation 27% with a ferritin level of 34.  Flow cytometry showed no definitive diagnostic immunophenotypic abnormalities.  No evidence of overt lymphoproliferative disorder such as acute leukemia or circulating blasts.  T cells with large granular lymphocyte immunophenotype are not increased.  CBC indices indicate mild leukopenia with mild neutropenia.  We discussed etiology including medications, infections, inflammation, autoimmune processes versus an underlying myeloproliferative or myelodysplastic syndrome cannot be ruled out by flow cytometry.  T-cell gamma gene rearrangement negative.  Overall patient is able to function without restriction.  She is from [place], benign ethnic neutropenia is also a consideration.  Patient denies fevers, chills, night sweats, abnormal weight loss.  Patient has not had a mammogram since 2017, however 1 is ordered.  She is also not seen gastroenterology however had a negative Cologuard test.  We will repeat labs in 6 months and see her shortly after.  If all is stable likely  discharge back to PCP. Patient and her son-in-law verbalized understanding and is in agreement to the plan. Patient knows to call the Hematology/Oncology office with any questions and concerns regarding the above.    - CBC and differential; Future  - Iron Panel (Includes Ferritin, Iron Sat%, Iron, and TIBC); Future    Barrier(s) to care: None  The patient is able to self care.    Juany GARNICA  Medical Oncology/Hematology  [facility]    Interval history: clinically stable    Review of Systems   Constitutional:  Negative for activity change, appetite change, fatigue, fever and unexpected weight change.   Respiratory:  Negative for cough and shortness of breath.    Cardiovascular:  Negative for chest pain and leg swelling.   Gastrointestinal:  Negative for abdominal pain, constipation, diarrhea and nausea.   Endocrine: Negative for cold intolerance and heat intolerance.   Musculoskeletal:  Positive for arthralgias. Negative for myalgias.   Skin: Negative.    Neurological:  Negative for dizziness, weakness and headaches.   Hematological:  Negative for adenopathy. Does not bruise/bleed easily.     Past Medical History:   Diagnosis Date    Hyperlipidemia     Hypertension      Past Surgical History:   Procedure Laterality Date     SECTION       Current Outpatient Medications:     aspirin (ECOTRIN LOW STRENGTH) 81 mg EC tablet, Take 1 tablet (81 mg total) by mouth every 24 hours, Disp: 90 tablet, Rfl: 3    atorvastatin (LIPITOR) 20 mg tablet, Take 1 tablet (20 mg total) by mouth daily, Disp: 90 tablet, Rfl: 1    calcium carbonate-vitamin D 250 mg-3.125 mcg tablet, Take 2 tablets by mouth daily, Disp: , Rfl:     carboxymethylcellulose (GoodSense Lubricating Eye Drop) 0.5 % SOLN, Administer 1 drop to both eyes 3 (three) times a day as needed for dry eyes, Disp: 70 each, Rfl: 1    lidocaine (LMX) 4 % cream, Apply topically as needed for mild pain, Disp: 120 g, Rfl: 1    loratadine  "(CLARITIN) 10 mg tablet, Take 1 tablet (10 mg total) by mouth daily, Disp: 30 tablet, Rfl: 2    losartan-hydrochlorothiazide (HYZAAR) 100-25 MG per tablet, Take 1 tablet by mouth daily, Disp: 90 tablet, Rfl: 3    methocarbamol (ROBAXIN) 500 mg tablet, Take 1 tablet (500 mg total) by mouth 2 (two) times a day as needed for muscle spasms, Disp: 60 tablet, Rfl: 1    triamcinolone (KENALOG) 0.1 % cream, Apply topically 2 (two) times a day, Disp: 30 g, Rfl: 0    Allergies   Allergen Reactions    No Active Allergies     No Known Allergies      Objective:  /78 (BP Location: Right arm, Patient Position: Sitting, Cuff Size: Adult)   Pulse 94   Temp 97.8 °F (36.6 °C)   Resp 17   Ht 4' 10\" (1.473 m)   Wt 73.5 kg (162 lb)   SpO2 97%   BMI 33.86 kg/m²    Physical Exam  Vitals reviewed.   Constitutional:       Appearance: Normal appearance. She is well-developed.   HENT:      Head: Normocephalic and atraumatic.   Eyes:      Conjunctiva/sclera: Conjunctivae normal.      Pupils: Pupils are equal, round, and reactive to light.   Pulmonary:      Effort: Pulmonary effort is normal. No respiratory distress.   Musculoskeletal:         General: Normal range of motion.      Cervical back: Normal range of motion.   Lymphadenopathy:      Cervical: No cervical adenopathy.   Skin:     General: Skin is dry.   Neurological:      Mental Status: She is alert and oriented to person, place, and time.   Psychiatric:         Behavior: Behavior normal.     Result Review  Labs:  Lab Results   Component Value Date    WBC 3.87 (L) 02/12/2024    HGB 13.9 02/12/2024    HCT 42.7 02/12/2024    MCV 86 02/12/2024     02/12/2024     Lab Results   Component Value Date    SODIUM 140 02/12/2024    K 4.2 02/12/2024     02/12/2024    CO2 28 02/12/2024    AGAP 12 02/12/2024    BUN 19 02/12/2024    CREATININE 0.79 02/12/2024    GLUC 101 (H) 09/22/2021    GLUF 113 (H) 02/12/2024    CALCIUM 10.1 02/12/2024    AST 24 02/12/2024    ALT 23 " 02/12/2024    ALKPHOS 67 02/12/2024    TP 8.2 02/12/2024    TBILI 0.65 02/12/2024    EGFR 77 02/12/2024     Imaging:  No results found.    Please note:  This report has been generated by a voice recognition software system. Therefore there may be syntax, spelling, and/or grammatical errors. Please call if you have any questions.

## 2024-05-22 ENCOUNTER — TELEPHONE (OUTPATIENT)
Dept: ADMINISTRATIVE | Facility: HOSPITAL | Age: 69
End: 2024-05-22

## 2024-05-23 ENCOUNTER — TELEPHONE (OUTPATIENT)
Facility: HOSPITAL | Age: 69
End: 2024-05-23

## 2024-05-23 NOTE — TELEPHONE ENCOUNTER
Telephone Call    [] Called Patient regarding [facility]; Patient answered call and wants to enroll; Asked for a call back.    [] Called Patient regarding [facility]; Patient answered call; Declined to enroll in program.    [] Called Patient regarding [facility]; Patient did not ; Left voicemail with my name and direct phone number.     [x] Called Patient regarding [facility]; Patient did not ; Unable to leave voicemail.    [] Called Patient regarding [facility]; Phone number is invalid    Attempts (Max 3): [x]1   []2   []3        Additional Notes:

## 2024-06-02 DIAGNOSIS — E78.2 MIXED HYPERLIPIDEMIA: ICD-10-CM

## 2024-06-03 ENCOUNTER — TELEPHONE (OUTPATIENT)
Facility: HOSPITAL | Age: 69
End: 2024-06-03

## 2024-06-03 RX ORDER — ATORVASTATIN CALCIUM 20 MG/1
20 TABLET, FILM COATED ORAL DAILY
Qty: 90 TABLET | Refills: 1 | Status: SHIPPED | OUTPATIENT
Start: 2024-06-03

## 2024-06-03 NOTE — TELEPHONE ENCOUNTER
Telephone Call    [] Called Patient regarding [facility]; Patient answered call and wants to enroll; Asked for a call back.    [] Called Patient regarding [facility]; Patient answered call; Declined to enroll in program.    [] Called Patient regarding [facility]; Patient did not ; Left voicemail with my name and direct phone number.     [x] Called Patient regarding [facility]; Patient did not ; Unable to leave voicemail.    [] Called Patient regarding [facility]; Phone number is invalid    Attempts (Max 3): []1   [x]2   []3        Additional Notes:

## 2024-06-05 ENCOUNTER — TELEPHONE (OUTPATIENT)
Facility: HOSPITAL | Age: 69
End: 2024-06-05

## 2024-06-05 NOTE — TELEPHONE ENCOUNTER
Telephone Call    [] Called Patient regarding [facility]; Patient answered call and wants to enroll; Asked for a call back.    [] Called Patient regarding [facility]; Patient answered call; Declined to enroll in program.    [] Called Patient regarding [facility]; Patient did not ; Left voicemail with my name and direct phone number.     [x] Called Patient regarding [facility]; Patient did not ; Unable to leave voicemail.    [] Called Patient regarding [facility]; Phone number is invalid    Attempts (Max 3): []1   []2   [x]3        Additional Notes:

## 2024-09-12 ENCOUNTER — TELEPHONE (OUTPATIENT)
Dept: HEMATOLOGY ONCOLOGY | Facility: CLINIC | Age: 69
End: 2024-09-12

## 2024-09-16 ENCOUNTER — TELEPHONE (OUTPATIENT)
Dept: HEMATOLOGY ONCOLOGY | Facility: CLINIC | Age: 69
End: 2024-09-16

## 2024-09-16 NOTE — TELEPHONE ENCOUNTER
Called PT for missed joseph with Juany, PT was unable to come in she has no coverage at the moment, is currently reapplying, FU was scheduled for 10/21.  I also reminded son manish to please have PT complete the blood work before next visit.

## 2024-10-17 ENCOUNTER — TELEPHONE (OUTPATIENT)
Dept: HEMATOLOGY ONCOLOGY | Facility: CLINIC | Age: 69
End: 2024-10-17

## 2024-10-17 NOTE — TELEPHONE ENCOUNTER
Unable to leave a voicemail due to her mailbox not being set up.  I will try her back tomorrow.  Patient will need her labs prior to her appointment on Monday with Juany.

## 2024-10-18 ENCOUNTER — TELEPHONE (OUTPATIENT)
Dept: HEMATOLOGY ONCOLOGY | Facility: CLINIC | Age: 69
End: 2024-10-18

## 2024-10-18 NOTE — TELEPHONE ENCOUNTER
Called patient to remind her of her labs before her upcoming appointment with Juany.  I spoke to Yaniv her son in law.  He states she has no medical insurance and will need to reschedule the appointment.  Appointment rescheduled out to give her time for her to try and get medical insurance.

## 2024-11-22 ENCOUNTER — TELEPHONE (OUTPATIENT)
Dept: HEMATOLOGY ONCOLOGY | Facility: CLINIC | Age: 69
End: 2024-11-22

## 2024-11-27 ENCOUNTER — TELEPHONE (OUTPATIENT)
Dept: HEMATOLOGY ONCOLOGY | Facility: CLINIC | Age: 69
End: 2024-11-27

## 2025-01-24 DIAGNOSIS — I10 ESSENTIAL HYPERTENSION: ICD-10-CM

## 2025-01-24 RX ORDER — LOSARTAN POTASSIUM AND HYDROCHLOROTHIAZIDE 25; 100 MG/1; MG/1
1 TABLET ORAL DAILY
Qty: 90 TABLET | Refills: 3 | OUTPATIENT
Start: 2025-01-24

## 2025-01-27 ENCOUNTER — TELEPHONE (OUTPATIENT)
Dept: FAMILY MEDICINE CLINIC | Facility: CLINIC | Age: 70
End: 2025-01-27

## 2025-01-27 DIAGNOSIS — L03.011 CELLULITIS OF FINGER OF RIGHT HAND: ICD-10-CM

## 2025-01-27 DIAGNOSIS — I10 ESSENTIAL HYPERTENSION: ICD-10-CM

## 2025-01-27 RX ORDER — LORATADINE 10 MG/1
10 TABLET ORAL DAILY
Qty: 30 TABLET | Refills: 0 | Status: SHIPPED | OUTPATIENT
Start: 2025-01-27

## 2025-01-27 NOTE — TELEPHONE ENCOUNTER
Patient call requesting this medication to be refill:     losartan-hydrochlorothiazide (HYZAAR) 100-25 MG per tablet     Patient has an appointment.on 2/10

## 2025-02-03 RX ORDER — LOSARTAN POTASSIUM AND HYDROCHLOROTHIAZIDE 25; 100 MG/1; MG/1
1 TABLET ORAL DAILY
Qty: 30 TABLET | Refills: 0 | Status: SHIPPED | OUTPATIENT
Start: 2025-02-03

## 2025-02-03 NOTE — TELEPHONE ENCOUNTER
Pt son in law called nurse line requesting medication refills. 30 days supply of Losartan was sent to pharmacy until pt next apt.

## 2025-02-07 ENCOUNTER — TELEPHONE (OUTPATIENT)
Dept: FAMILY MEDICINE CLINIC | Facility: CLINIC | Age: 70
End: 2025-02-07

## 2025-02-10 ENCOUNTER — OFFICE VISIT (OUTPATIENT)
Dept: FAMILY MEDICINE CLINIC | Facility: CLINIC | Age: 70
End: 2025-02-10

## 2025-02-10 VITALS
HEART RATE: 70 BPM | TEMPERATURE: 97.4 F | OXYGEN SATURATION: 99 % | BODY MASS INDEX: 35.05 KG/M2 | HEIGHT: 58 IN | WEIGHT: 167 LBS | SYSTOLIC BLOOD PRESSURE: 132 MMHG | DIASTOLIC BLOOD PRESSURE: 68 MMHG | RESPIRATION RATE: 14 BRPM

## 2025-02-10 DIAGNOSIS — Z00.00 ANNUAL PHYSICAL EXAM: Primary | ICD-10-CM

## 2025-02-10 DIAGNOSIS — E78.2 MIXED HYPERLIPIDEMIA: ICD-10-CM

## 2025-02-10 DIAGNOSIS — I10 ESSENTIAL HYPERTENSION: ICD-10-CM

## 2025-02-10 DIAGNOSIS — D70.9 NEUTROPENIA, UNSPECIFIED TYPE (HCC): ICD-10-CM

## 2025-02-10 PROCEDURE — 99213 OFFICE O/P EST LOW 20 MIN: CPT | Performed by: PHYSICIAN ASSISTANT

## 2025-02-10 PROCEDURE — 99397 PER PM REEVAL EST PAT 65+ YR: CPT | Performed by: PHYSICIAN ASSISTANT

## 2025-02-10 PROCEDURE — 3078F DIAST BP <80 MM HG: CPT | Performed by: PHYSICIAN ASSISTANT

## 2025-02-10 PROCEDURE — 3075F SYST BP GE 130 - 139MM HG: CPT | Performed by: PHYSICIAN ASSISTANT

## 2025-02-10 RX ORDER — LOSARTAN POTASSIUM AND HYDROCHLOROTHIAZIDE 25; 100 MG/1; MG/1
1 TABLET ORAL DAILY
Qty: 90 TABLET | Refills: 3 | Status: SHIPPED | OUTPATIENT
Start: 2025-02-10

## 2025-02-10 NOTE — ASSESSMENT & PLAN NOTE
- Patient has been without atorvastatin for the past few months.  Will hold off on refill for now.  - Will plan to repeat blood work once patient has insurance again.  Will then decide if patient should restart taking atorvastatin.

## 2025-02-10 NOTE — ASSESSMENT & PLAN NOTE
- Continue losartan-hydrochlorothiazide 100-25 mg once daily.   - Currently blood pressure is controlled at this time.  Reviewed BP target goal with patient.    - Continue to maintain healthy balanced diet with focus on low salt intake. Limit alcohol intake.   - Advised to exercise at least 30 minutes a day for at least 5 days out of the week.     Orders:    losartan-hydrochlorothiazide (HYZAAR) 100-25 MG per tablet; Take 1 tablet by mouth daily

## 2025-02-10 NOTE — PROGRESS NOTES
Adult Annual Physical  Name: Nannette Mckeon      : 1955      MRN: [id number]  Encounter Provider: Tayler Green PA-C  Encounter Date: 2/10/2025   Encounter department: [facility]    Assessment & Plan  Annual physical exam  I have recommended that this patient have a mammogram and DEXA scan but she declines at this time, due to lack of insurance. I have discussed the risks and benefits of this examination with her. The patient verbalizes understanding.         Essential hypertension  - Continue losartan-hydrochlorothiazide 100-25 mg once daily.   - Currently blood pressure is controlled at this time.  Reviewed BP target goal with patient.    - Continue to maintain healthy balanced diet with focus on low salt intake. Limit alcohol intake.   - Advised to exercise at least 30 minutes a day for at least 5 days out of the week.     Orders:    losartan-hydrochlorothiazide (HYZAAR) 100-25 MG per tablet; Take 1 tablet by mouth daily    Mixed hyperlipidemia  - Patient has been without atorvastatin for the past few months.  Will hold off on refill for now.  - Will plan to repeat blood work once patient has insurance again.  Will then decide if patient should restart taking atorvastatin.         Neutropenia, unspecified type (HCC)  -Reviewed hematology/oncology notes from 2024 and 3/11/2024.  CBC indices indicate mild leukopenia with mild neutropenia.  Patient recommended to have repeat labs in 6 months and have follow-up shortly after.  However, patient was lost to follow-up due to losing her insurance.  Recommend patient complete labs and have follow-up with hematology/oncology once she has insurance again.         Immunizations and preventive care screenings were discussed with patient today. Appropriate education was printed on patient's after visit summary.    Counseling:  Alcohol/drug use: discussed moderation in alcohol intake, the recommendations for healthy  alcohol use, and avoidance of illicit drug use.  Dental Health: discussed importance of regular tooth brushing, flossing, and dental visits.  Injury prevention: discussed safety/seat belts, safety helmets, smoke detectors, carbon monoxide detectors, and smoking near bedding or upholstery.  Sexual health: discussed sexually transmitted diseases, partner selection, use of condoms, avoidance of unintended pregnancy, and contraceptive alternatives.  Exercise: the importance of regular exercise/physical activity was discussed. Recommend exercise 3-5 times per week for at least 30 minutes.     BMI Counseling: Body mass index is 34.9 kg/m². The BMI is above normal. Nutrition recommendations include decreasing portion sizes, encouraging healthy choices of fruits and vegetables, consuming healthier snacks, limiting drinks that contain sugar, moderation in carbohydrate intake, increasing intake of lean protein and reducing intake of cholesterol. Exercise recommendations include moderate physical activity 150 minutes/week. No pharmacotherapy was ordered. Rationale for BMI follow-up plan is due to patient being overweight or obese.     Depression Screening and Follow-up Plan: Patient was screened for depression during today's encounter. They screened negative with a PHQ-2 score of 0.        History of Present Illness     Adult Annual Physical:  Patient presents for annual physical.     Diet and Physical Activity:  - Diet/Nutrition: well balanced diet and heart healthy (low sodium) diet.  - Exercise: no formal exercise. active at work; walks to work and back    Depression Screening:  - PHQ-2 Score: 0    General Health:  - Sleep: sleeps well and 7-8 hours of sleep on average.  - Hearing: normal hearing bilateral ears.  - Vision: wears glasses and goes for regular eye exams.  - Dental: brushes teeth twice daily.    /GYN Health:    - Menopause: postmenopausal.   - History of STDs: no    Advanced Care Planning:  - Has an advanced  directive?: no    - Has a durable medical POA?: no    - ACP document given to patient?: yes      -Patient notes she has been without insurance for the past few months so she has been purchasing her blood pressure medication out-of-pocket.  Patient notes she has been without atorvastatin for the past few months but has been taking her blood pressure medication.    -Patient notes she presented to urgent care on 2025 due to injuring her left knee during a fall.  Patient denies any head strike, loss of consciousness.  Left knee x-ray showed mild anterior soft tissue swelling and probable small knee effusion.  No acute fracture.  Minimal bicompartmental osteoarthritis.  Today, patient notes her left knee still causes her some pain, but is much improved.    Review of Systems   Constitutional:  Negative for chills and fever.   HENT:  Negative for congestion and sore throat.    Eyes:  Negative for pain.   Respiratory:  Negative for cough and shortness of breath.    Cardiovascular:  Negative for chest pain, palpitations and leg swelling.   Gastrointestinal:  Negative for abdominal pain, constipation, diarrhea, nausea and vomiting.   Genitourinary:  Negative for dysuria.   Musculoskeletal:  Positive for arthralgias (occasional left knee pain).   Skin:  Negative for rash.   Neurological:  Negative for dizziness and headaches.   Psychiatric/Behavioral:  Negative for sleep disturbance. The patient is not nervous/anxious.      Pertinent Medical History     Medical History Reviewed by provider this encounter:  Tobacco  Allergies  Meds  Problems  Med Hx  Surg Hx  Fam Hx     .  Past Medical History   Past Medical History:   Diagnosis Date    Hyperlipidemia     Hypertension      Past Surgical History:   Procedure Laterality Date     SECTION       Family History   Problem Relation Age of Onset    Asthma Mother     Hypertension Mother     COPD Father       reports that she has never smoked. She has never used  smokeless tobacco. She reports current alcohol use. She reports that she does not use drugs.  Current Outpatient Medications on File Prior to Visit   Medication Sig Dispense Refill    aspirin (ECOTRIN LOW STRENGTH) 81 mg EC tablet Take 1 tablet (81 mg total) by mouth every 24 hours 90 tablet 3    atorvastatin (LIPITOR) 20 mg tablet TAKE 1 TABLET BY MOUTH EVERY DAY 90 tablet 1    calcium carbonate-vitamin D 250 mg-3.125 mcg tablet Take 2 tablets by mouth daily      carboxymethylcellulose (GoodSense Lubricating Eye Drop) 0.5 % SOLN Administer 1 drop to both eyes 3 (three) times a day as needed for dry eyes 70 each 1    lidocaine (LMX) 4 % cream Apply topically as needed for mild pain 120 g 1    loratadine (CLARITIN) 10 mg tablet Take 1 tablet (10 mg total) by mouth daily 30 tablet 0    methocarbamol (ROBAXIN) 500 mg tablet Take 1 tablet (500 mg total) by mouth 2 (two) times a day as needed for muscle spasms 60 tablet 1    triamcinolone (KENALOG) 0.1 % cream Apply topically 2 (two) times a day 30 g 0    [DISCONTINUED] losartan-hydrochlorothiazide (HYZAAR) 100-25 MG per tablet Take 1 tablet by mouth daily 30 tablet 0     No current facility-administered medications on file prior to visit.     Allergies   Allergen Reactions    No Active Allergies     No Known Allergies       Current Outpatient Medications on File Prior to Visit   Medication Sig Dispense Refill    aspirin (ECOTRIN LOW STRENGTH) 81 mg EC tablet Take 1 tablet (81 mg total) by mouth every 24 hours 90 tablet 3    atorvastatin (LIPITOR) 20 mg tablet TAKE 1 TABLET BY MOUTH EVERY DAY 90 tablet 1    calcium carbonate-vitamin D 250 mg-3.125 mcg tablet Take 2 tablets by mouth daily      carboxymethylcellulose (GoodSense Lubricating Eye Drop) 0.5 % SOLN Administer 1 drop to both eyes 3 (three) times a day as needed for dry eyes 70 each 1    lidocaine (LMX) 4 % cream Apply topically as needed for mild pain 120 g 1    loratadine (CLARITIN) 10 mg tablet Take 1 tablet  "(10 mg total) by mouth daily 30 tablet 0    methocarbamol (ROBAXIN) 500 mg tablet Take 1 tablet (500 mg total) by mouth 2 (two) times a day as needed for muscle spasms 60 tablet 1    triamcinolone (KENALOG) 0.1 % cream Apply topically 2 (two) times a day 30 g 0    [DISCONTINUED] losartan-hydrochlorothiazide (HYZAAR) 100-25 MG per tablet Take 1 tablet by mouth daily 30 tablet 0     No current facility-administered medications on file prior to visit.      Social History     Tobacco Use    Smoking status: Never    Smokeless tobacco: Never   Vaping Use    Vaping status: Never Used   Substance and Sexual Activity    Alcohol use: Yes     Comment: social    Drug use: No    Sexual activity: Not on file       Objective   /68 (BP Location: Left arm, Patient Position: Sitting, Cuff Size: Large)   Pulse 70   Temp (!) 97.4 °F (36.3 °C) (Temporal)   Resp 14   Ht 4' 10\" (1.473 m)   Wt 75.8 kg (167 lb)   SpO2 99%   BMI 34.90 kg/m²     Physical Exam  Vitals and nursing note reviewed.   Constitutional:       General: She is not in acute distress.     Appearance: She is well-developed.   HENT:      Head: Normocephalic and atraumatic.      Right Ear: Tympanic membrane and external ear normal.      Left Ear: Tympanic membrane and external ear normal.      Nose: Nose normal.      Mouth/Throat:      Pharynx: Uvula midline. No posterior oropharyngeal erythema.   Eyes:      Extraocular Movements: Extraocular movements intact.      Conjunctiva/sclera: Conjunctivae normal.      Pupils: Pupils are equal, round, and reactive to light.   Cardiovascular:      Rate and Rhythm: Normal rate and regular rhythm.      Pulses: Normal pulses.      Heart sounds: Normal heart sounds. No murmur heard.  Pulmonary:      Effort: Pulmonary effort is normal. No respiratory distress.      Breath sounds: Normal breath sounds. No wheezing.   Abdominal:      General: Bowel sounds are normal.      Palpations: Abdomen is soft.      Tenderness: There is no " abdominal tenderness.   Musculoskeletal:      Cervical back: Normal range of motion and neck supple.      Right lower leg: No edema.      Left lower leg: No edema.   Skin:     General: Skin is warm.   Neurological:      Mental Status: She is alert and oriented to person, place, and time.   Psychiatric:         Speech: Speech normal.         Behavior: Behavior normal.

## 2025-02-10 NOTE — ASSESSMENT & PLAN NOTE
-Reviewed hematology/oncology notes from 2/12/2024 and 3/11/2024.  CBC indices indicate mild leukopenia with mild neutropenia.  Patient recommended to have repeat labs in 6 months and have follow-up shortly after.  However, patient was lost to follow-up due to losing her insurance.  Recommend patient complete labs and have follow-up with hematology/oncology once she has insurance again.

## 2025-04-21 ENCOUNTER — OFFICE VISIT (OUTPATIENT)
Dept: FAMILY MEDICINE CLINIC | Facility: CLINIC | Age: 70
End: 2025-04-21

## 2025-04-21 ENCOUNTER — HOSPITAL ENCOUNTER (OUTPATIENT)
Dept: RADIOLOGY | Facility: HOSPITAL | Age: 70
Discharge: HOME | End: 2025-04-21

## 2025-04-21 VITALS
WEIGHT: 166 LBS | TEMPERATURE: 97.7 F | BODY MASS INDEX: 34.85 KG/M2 | RESPIRATION RATE: 14 BRPM | OXYGEN SATURATION: 97 % | SYSTOLIC BLOOD PRESSURE: 144 MMHG | DIASTOLIC BLOOD PRESSURE: 72 MMHG | HEIGHT: 58 IN | HEART RATE: 85 BPM

## 2025-04-21 DIAGNOSIS — M54.42 ACUTE LEFT-SIDED LOW BACK PAIN WITH LEFT-SIDED SCIATICA: ICD-10-CM

## 2025-04-21 PROCEDURE — 72110 X-RAY EXAM L-2 SPINE 4/>VWS: CPT

## 2025-04-21 PROCEDURE — 99213 OFFICE O/P EST LOW 20 MIN: CPT | Performed by: PHYSICIAN ASSISTANT

## 2025-04-21 RX ORDER — METHOCARBAMOL 500 MG/1
500 TABLET, FILM COATED ORAL 2 TIMES DAILY PRN
Qty: 60 TABLET | Refills: 1 | Status: SHIPPED | OUTPATIENT
Start: 2025-04-21

## 2025-04-21 NOTE — ASSESSMENT & PLAN NOTE
-Will order lumbar spine x-ray for further evaluation.  - Will prescribe Robaxin 500 mg twice daily as needed. Discussed possible side effects of drowsiness.  Advised to avoid driving or operating heavy machinery when taking medication.  -Will prescribe Voltaren gel, apply 4 times daily as needed to affected area.  -Advised to apply ice/heating pad/topical therapies as needed to affected area.  - Provided patient with list of exercises to perform daily.  - If symptoms persist/worsen, would recommend referral to physical therapy.  Orders:    methocarbamol (ROBAXIN) 500 mg tablet; Take 1 tablet (500 mg total) by mouth 2 (two) times a day as needed for muscle spasms    Diclofenac Sodium (VOLTAREN) 1 %; Apply 2 g topically 4 (four) times a day    XR spine lumbar minimum 4 views non injury; Future

## 2025-04-21 NOTE — PROGRESS NOTES
Name: Nannette Mckeon      : 1955      MRN: [id number]  Encounter Provider: Tayler Green PA-C  Encounter Date: 2025   Encounter department: [facility]  :  Assessment & Plan  Acute left-sided low back pain with left-sided sciatica  -Will order lumbar spine x-ray for further evaluation.  - Will prescribe Robaxin 500 mg twice daily as needed. Discussed possible side effects of drowsiness.  Advised to avoid driving or operating heavy machinery when taking medication.  -Will prescribe Voltaren gel, apply 4 times daily as needed to affected area.  -Advised to apply ice/heating pad/topical therapies as needed to affected area.  - Provided patient with list of exercises to perform daily.  - If symptoms persist/worsen, would recommend referral to physical therapy.  Orders:    methocarbamol (ROBAXIN) 500 mg tablet; Take 1 tablet (500 mg total) by mouth 2 (two) times a day as needed for muscle spasms    Diclofenac Sodium (VOLTAREN) 1 %; Apply 2 g topically 4 (four) times a day    XR spine lumbar minimum 4 views non injury; Future           History of Present Illness     Patient is a 69 y.o. female whom  has a past medical history of Hyperlipidemia and Hypertension. who is seen today in office for back pain.  Patient is accompanied today by her son who helps with translation from Hebrew to English.    -Patient notes recently she has been experiencing left lower back pain which radiates into her left hip and down left leg to about the ankle area.  She reports the pain is worse when she is walking.  Patient notes at times she has to limp when she is walking due to the pain.  Patient notes about 15 years ago she had injured her back when she was lifting a window.  Patient notes she did go to a specialist in [place] and received an injection.  Patient notes that this pain is different than the pain she experienced at that time.  Patient notes some numbness and  "tingling down the left leg, but she denies any fevers, saddle anesthesia, or loss of bladder or bowel function.  She has been taking Tylenol with mild relief.      Review of Systems   Constitutional:  Negative for chills and fever.   HENT:  Negative for congestion and sore throat.    Eyes:  Negative for pain.   Respiratory:  Negative for cough and shortness of breath.    Cardiovascular:  Negative for chest pain, palpitations and leg swelling.   Gastrointestinal:  Negative for abdominal pain, constipation, diarrhea, nausea and vomiting.   Genitourinary:  Negative for dysuria.   Musculoskeletal:  Positive for arthralgias (occasional left knee pain), back pain and gait problem.   Skin:  Negative for rash.   Neurological:  Negative for dizziness and headaches.   Psychiatric/Behavioral:  Negative for sleep disturbance. The patient is not nervous/anxious.        Objective   /72 (BP Location: Left arm, Patient Position: Sitting, Cuff Size: Standard)   Pulse 85   Temp 97.7 °F (36.5 °C) (Temporal)   Resp 14   Ht 4' 10\" (1.473 m)   Wt 75.3 kg (166 lb)   SpO2 97%   BMI 34.69 kg/m²      Physical Exam  Vitals and nursing note reviewed.   Constitutional:       General: She is not in acute distress.     Appearance: She is well-developed.   HENT:      Head: Normocephalic and atraumatic.      Right Ear: External ear normal.      Left Ear: External ear normal.      Nose: Nose normal.      Mouth/Throat:      Pharynx: Uvula midline.   Eyes:      Conjunctiva/sclera: Conjunctivae normal.   Cardiovascular:      Rate and Rhythm: Normal rate and regular rhythm.      Pulses: Normal pulses.      Heart sounds: Normal heart sounds. No murmur heard.  Pulmonary:      Effort: Pulmonary effort is normal. No respiratory distress.      Breath sounds: Normal breath sounds. No wheezing.   Musculoskeletal:      Cervical back: Normal range of motion and neck supple.      Lumbar back: Tenderness (left paralumbar area) present. Decreased range " of motion. Negative right straight leg raise test and negative left straight leg raise test.   Skin:     General: Skin is warm.   Neurological:      Mental Status: She is alert and oriented to person, place, and time.   Psychiatric:         Speech: Speech normal.         Behavior: Behavior normal.

## 2025-05-30 ENCOUNTER — RESULTS FOLLOW-UP (OUTPATIENT)
Dept: FAMILY MEDICINE CLINIC | Facility: CLINIC | Age: 70
End: 2025-05-30